# Patient Record
Sex: MALE | Race: WHITE | NOT HISPANIC OR LATINO
[De-identification: names, ages, dates, MRNs, and addresses within clinical notes are randomized per-mention and may not be internally consistent; named-entity substitution may affect disease eponyms.]

---

## 2019-07-16 ENCOUNTER — APPOINTMENT (OUTPATIENT)
Dept: FAMILY MEDICINE | Facility: CLINIC | Age: 69
End: 2019-07-16
Payer: MEDICARE

## 2019-07-16 ENCOUNTER — RECORD ABSTRACTING (OUTPATIENT)
Age: 69
End: 2019-07-16

## 2019-07-16 VITALS — DIASTOLIC BLOOD PRESSURE: 75 MMHG | SYSTOLIC BLOOD PRESSURE: 115 MMHG | TEMPERATURE: 72 F

## 2019-07-16 VITALS
TEMPERATURE: 98.1 F | HEIGHT: 68 IN | OXYGEN SATURATION: 98 % | HEART RATE: 89 BPM | WEIGHT: 190.4 LBS | BODY MASS INDEX: 28.85 KG/M2

## 2019-07-16 DIAGNOSIS — Z80.3 FAMILY HISTORY OF MALIGNANT NEOPLASM OF BREAST: ICD-10-CM

## 2019-07-16 DIAGNOSIS — Z83.79 FAMILY HISTORY OF OTHER DISEASES OF THE DIGESTIVE SYSTEM: ICD-10-CM

## 2019-07-16 DIAGNOSIS — Z78.9 OTHER SPECIFIED HEALTH STATUS: ICD-10-CM

## 2019-07-16 DIAGNOSIS — Z87.39 PERSONAL HISTORY OF OTHER DISEASES OF THE MUSCULOSKELETAL SYSTEM AND CONNECTIVE TISSUE: ICD-10-CM

## 2019-07-16 DIAGNOSIS — I25.2 OLD MYOCARDIAL INFARCTION: ICD-10-CM

## 2019-07-16 PROCEDURE — 99214 OFFICE O/P EST MOD 30 MIN: CPT

## 2020-02-13 ENCOUNTER — APPOINTMENT (OUTPATIENT)
Dept: FAMILY MEDICINE | Facility: CLINIC | Age: 70
End: 2020-02-13
Payer: MEDICARE

## 2020-02-13 VITALS
DIASTOLIC BLOOD PRESSURE: 68 MMHG | OXYGEN SATURATION: 97 % | HEART RATE: 83 BPM | BODY MASS INDEX: 29.4 KG/M2 | TEMPERATURE: 97.8 F | WEIGHT: 193.38 LBS | SYSTOLIC BLOOD PRESSURE: 118 MMHG

## 2020-02-13 DIAGNOSIS — G47.00 INSOMNIA, UNSPECIFIED: ICD-10-CM

## 2020-02-13 PROCEDURE — 99213 OFFICE O/P EST LOW 20 MIN: CPT | Mod: 25

## 2020-02-13 PROCEDURE — 36415 COLL VENOUS BLD VENIPUNCTURE: CPT

## 2020-02-13 RX ORDER — METOPROLOL SUCCINATE 100 MG/1
100 TABLET, EXTENDED RELEASE ORAL
Refills: 0 | Status: DISCONTINUED | COMMUNITY
End: 2020-02-13

## 2020-02-13 RX ORDER — PANTOPRAZOLE SODIUM 40 MG/1
40 TABLET, DELAYED RELEASE ORAL
Refills: 0 | Status: DISCONTINUED | COMMUNITY
End: 2020-02-13

## 2020-02-13 RX ORDER — EZETIMIBE 10 MG/1
10 TABLET ORAL
Refills: 0 | Status: DISCONTINUED | COMMUNITY
End: 2020-02-13

## 2020-02-13 NOTE — PHYSICAL EXAM
[Normal Sclera/Conjunctiva] : normal sclera/conjunctiva [Normal Outer Ear/Nose] : the outer ears and nose were normal in appearance [Normal] : normal rate, regular rhythm, normal S1 and S2 and no murmur heard [Normal Supraclavicular Nodes] : no supraclavicular lymphadenopathy [Normal Posterior Cervical Nodes] : no posterior cervical lymphadenopathy [Normal Anterior Cervical Nodes] : no anterior cervical lymphadenopathy

## 2020-02-13 NOTE — ASSESSMENT
[FreeTextEntry1] : Patient to go for a chest x-ray, renewal of medication, patient is to consider getting sleep studies as recommended.

## 2020-02-13 NOTE — HISTORY OF PRESENT ILLNESS
[FreeTextEntry1] : Pt is here for refills on medications. Pt needs Atorvastatin 40 mg, Lisinopril 5 mg, Metoprolol Tart 25 mg, Pantoprazole 40 mg, Clonazepam 0.5 mg.   [de-identified] : 70 yo male here for medication renewal, complaining of night sweats.

## 2020-02-18 LAB
ALBUMIN SERPL ELPH-MCNC: 4.7 G/DL
ALP BLD-CCNC: 42 U/L
ALT SERPL-CCNC: 36 U/L
ANION GAP SERPL CALC-SCNC: 12 MMOL/L
AST SERPL-CCNC: 37 U/L
BASOPHILS # BLD AUTO: 0.07 K/UL
BASOPHILS NFR BLD AUTO: 0.7 %
BILIRUB DIRECT SERPL-MCNC: 0.1 MG/DL
BILIRUB INDIRECT SERPL-MCNC: 0.1 MG/DL
BILIRUB SERPL-MCNC: 0.2 MG/DL
BUN SERPL-MCNC: 17 MG/DL
CALCIUM SERPL-MCNC: 10.3 MG/DL
CHLORIDE SERPL-SCNC: 106 MMOL/L
CHOLEST SERPL-MCNC: 142 MG/DL
CHOLEST/HDLC SERPL: 3.6 RATIO
CO2 SERPL-SCNC: 23 MMOL/L
CREAT SERPL-MCNC: 1.3 MG/DL
EOSINOPHIL # BLD AUTO: 0.21 K/UL
EOSINOPHIL NFR BLD AUTO: 2.2 %
ESTIMATED AVERAGE GLUCOSE: 123 MG/DL
GLUCOSE SERPL-MCNC: 98 MG/DL
HBA1C MFR BLD HPLC: 5.9 %
HCT VFR BLD CALC: 47.5 %
HDLC SERPL-MCNC: 39 MG/DL
HGB BLD-MCNC: 14.2 G/DL
IMM GRANULOCYTES NFR BLD AUTO: 0.2 %
LDLC SERPL CALC-MCNC: 74 MG/DL
LYMPHOCYTES # BLD AUTO: 2.82 K/UL
LYMPHOCYTES NFR BLD AUTO: 30.1 %
MAN DIFF?: NORMAL
MCHC RBC-ENTMCNC: 24.7 PG
MCHC RBC-ENTMCNC: 29.9 GM/DL
MCV RBC AUTO: 82.5 FL
MONOCYTES # BLD AUTO: 0.82 K/UL
MONOCYTES NFR BLD AUTO: 8.8 %
NEUTROPHILS # BLD AUTO: 5.42 K/UL
NEUTROPHILS NFR BLD AUTO: 58 %
PLATELET # BLD AUTO: 304 K/UL
POTASSIUM SERPL-SCNC: 4.6 MMOL/L
PROT SERPL-MCNC: 7.2 G/DL
RBC # BLD: 5.76 M/UL
RBC # FLD: 15.9 %
SODIUM SERPL-SCNC: 142 MMOL/L
T4 FREE SERPL-MCNC: 1.3 NG/DL
TRIGL SERPL-MCNC: 143 MG/DL
TSH SERPL-ACNC: 1.36 UIU/ML
WBC # FLD AUTO: 9.36 K/UL

## 2020-02-25 DIAGNOSIS — J84.112 IDIOPATHIC PULMONARY FIBROSIS: ICD-10-CM

## 2021-02-10 ENCOUNTER — NON-APPOINTMENT (OUTPATIENT)
Age: 71
End: 2021-02-10

## 2021-02-10 ENCOUNTER — APPOINTMENT (OUTPATIENT)
Dept: FAMILY MEDICINE | Facility: CLINIC | Age: 71
End: 2021-02-10
Payer: MEDICARE

## 2021-02-10 ENCOUNTER — RESULT CHARGE (OUTPATIENT)
Age: 71
End: 2021-02-10

## 2021-02-10 VITALS
BODY MASS INDEX: 28.64 KG/M2 | HEIGHT: 68 IN | TEMPERATURE: 97.1 F | HEART RATE: 67 BPM | WEIGHT: 189 LBS | OXYGEN SATURATION: 98 %

## 2021-02-10 VITALS — HEART RATE: 72 BPM | SYSTOLIC BLOOD PRESSURE: 120 MMHG | DIASTOLIC BLOOD PRESSURE: 75 MMHG

## 2021-02-10 LAB
BILIRUB UR QL STRIP: NORMAL
GLUCOSE UR-MCNC: NORMAL
HCG UR QL: 0.2 EU/DL
HGB UR QL STRIP.AUTO: NORMAL
KETONES UR-MCNC: NORMAL
LEUKOCYTE ESTERASE UR QL STRIP: NORMAL
NITRITE UR QL STRIP: NORMAL
PH UR STRIP: 5.5
PROT UR STRIP-MCNC: NORMAL
SP GR UR STRIP: 1.02

## 2021-02-10 PROCEDURE — 99214 OFFICE O/P EST MOD 30 MIN: CPT | Mod: 25

## 2021-02-10 PROCEDURE — 81003 URINALYSIS AUTO W/O SCOPE: CPT | Mod: QW

## 2021-02-10 PROCEDURE — 69210 REMOVE IMPACTED EAR WAX UNI: CPT

## 2021-02-10 PROCEDURE — 36415 COLL VENOUS BLD VENIPUNCTURE: CPT

## 2021-02-10 NOTE — HISTORY OF PRESENT ILLNESS
[FreeTextEntry1] : pt. here for refill and his ear [de-identified] : pt complaining of tinnitus left ear

## 2021-02-11 LAB
ALBUMIN SERPL ELPH-MCNC: 4.6 G/DL
ALP BLD-CCNC: 41 U/L
ALT SERPL-CCNC: 39 U/L
ANION GAP SERPL CALC-SCNC: 15 MMOL/L
AST SERPL-CCNC: 36 U/L
BASOPHILS # BLD AUTO: 0.05 K/UL
BASOPHILS NFR BLD AUTO: 0.6 %
BILIRUB SERPL-MCNC: 0.3 MG/DL
BUN SERPL-MCNC: 17 MG/DL
CALCIUM SERPL-MCNC: 9.9 MG/DL
CHLORIDE SERPL-SCNC: 105 MMOL/L
CHOLEST SERPL-MCNC: 152 MG/DL
CO2 SERPL-SCNC: 19 MMOL/L
CREAT SERPL-MCNC: 1.44 MG/DL
EOSINOPHIL # BLD AUTO: 0.21 K/UL
EOSINOPHIL NFR BLD AUTO: 2.5 %
ESTIMATED AVERAGE GLUCOSE: 126 MG/DL
GLUCOSE SERPL-MCNC: 90 MG/DL
HBA1C MFR BLD HPLC: 6 %
HCT VFR BLD CALC: 47.8 %
HDLC SERPL-MCNC: 40 MG/DL
HGB BLD-MCNC: 14.8 G/DL
IMM GRANULOCYTES NFR BLD AUTO: 0.2 %
LDLC SERPL CALC-MCNC: 83 MG/DL
LYMPHOCYTES # BLD AUTO: 2.61 K/UL
LYMPHOCYTES NFR BLD AUTO: 30.7 %
MAN DIFF?: NORMAL
MCHC RBC-ENTMCNC: 25.3 PG
MCHC RBC-ENTMCNC: 31 GM/DL
MCV RBC AUTO: 81.8 FL
MONOCYTES # BLD AUTO: 0.69 K/UL
MONOCYTES NFR BLD AUTO: 8.1 %
NEUTROPHILS # BLD AUTO: 4.91 K/UL
NEUTROPHILS NFR BLD AUTO: 57.9 %
NONHDLC SERPL-MCNC: 112 MG/DL
PLATELET # BLD AUTO: 308 K/UL
POTASSIUM SERPL-SCNC: 4.4 MMOL/L
PROT SERPL-MCNC: 7.5 G/DL
RBC # BLD: 5.84 M/UL
RBC # FLD: 17 %
SODIUM SERPL-SCNC: 139 MMOL/L
T4 FREE SERPL-MCNC: 1.2 NG/DL
TRIGL SERPL-MCNC: 144 MG/DL
TSH SERPL-ACNC: 1.49 UIU/ML
WBC # FLD AUTO: 8.49 K/UL

## 2021-02-20 ENCOUNTER — NON-APPOINTMENT (OUTPATIENT)
Age: 71
End: 2021-02-20

## 2022-01-19 ENCOUNTER — RX RENEWAL (OUTPATIENT)
Age: 72
End: 2022-01-19

## 2022-01-31 ENCOUNTER — RX RENEWAL (OUTPATIENT)
Age: 72
End: 2022-01-31

## 2022-02-16 ENCOUNTER — RX RENEWAL (OUTPATIENT)
Age: 72
End: 2022-02-16

## 2022-10-19 ENCOUNTER — APPOINTMENT (OUTPATIENT)
Dept: FAMILY MEDICINE | Facility: CLINIC | Age: 72
End: 2022-10-19

## 2022-10-19 VITALS
RESPIRATION RATE: 14 BRPM | OXYGEN SATURATION: 98 % | HEART RATE: 86 BPM | TEMPERATURE: 97.4 F | DIASTOLIC BLOOD PRESSURE: 68 MMHG | WEIGHT: 177.2 LBS | SYSTOLIC BLOOD PRESSURE: 106 MMHG | BODY MASS INDEX: 26.94 KG/M2

## 2022-10-19 DIAGNOSIS — Z00.00 ENCOUNTER FOR GENERAL ADULT MEDICAL EXAMINATION W/OUT ABNORMAL FINDINGS: ICD-10-CM

## 2022-10-19 PROCEDURE — 36415 COLL VENOUS BLD VENIPUNCTURE: CPT

## 2022-10-19 PROCEDURE — G0439: CPT

## 2022-10-19 PROCEDURE — G0444 DEPRESSION SCREEN ANNUAL: CPT

## 2022-10-19 RX ORDER — ZOLPIDEM TARTRATE 5 MG/1
5 TABLET ORAL
Qty: 30 | Refills: 1 | Status: DISCONTINUED | COMMUNITY
End: 2022-10-19

## 2022-10-19 RX ORDER — ALFUZOSIN HYDROCHLORIDE 10 MG/1
10 TABLET, EXTENDED RELEASE ORAL
Qty: 60 | Refills: 0 | Status: ACTIVE | COMMUNITY
Start: 2022-01-31

## 2022-10-19 RX ORDER — PANTOPRAZOLE 40 MG/1
40 TABLET, DELAYED RELEASE ORAL DAILY
Qty: 90 | Refills: 3 | Status: DISCONTINUED | COMMUNITY
Start: 2022-01-31 | End: 2022-10-19

## 2022-10-19 RX ORDER — FENOFIBRIC ACID 135 MG/1
135 CAPSULE, DELAYED RELEASE ORAL
Qty: 30 | Refills: 0 | Status: ACTIVE | COMMUNITY
Start: 2022-02-03

## 2022-10-19 NOTE — HISTORY OF PRESENT ILLNESS
[FreeTextEntry1] : Pt is here for CPE [de-identified] : 71 y/o with pmhx of  CAD s/p MI (2003 no stents), Carotid artery stenosis, HLD, HTN, GERD, BPH presents for CPE. Patient states that he has been having diffuse muscle pains. States it has started out of no where and has been on going for the past 3 weeks. Denies any trauma or recent illnesses. Denies any muscle weakness, numbness or tingling. Denies any joint pain. He follows with a cardiologist in [facility], Dr. Cachorro Mccord and states that at his last visit everything was normal.

## 2022-10-19 NOTE — ASSESSMENT
[FreeTextEntry1] : CPE:\par -will order CBC w/ diff, CMP, Lipid, TSH and HgbA1c, labs to be drawn in office\par \par Screening:\par -AAA:\par -Colon Cancer screening: patient with previous colonoscopy, he will attempt to obtain records\par \par Vaccinations:\par Seasonal flu  - recommended\par Pneumococcal -recommended\par Shingles - recommended\par \par -previous notes and labs reviewed, consultants notes reviewed\par -patient expressed understanding of plan, all questions answered\par -follow up in 1 year for annual CPE\par \par Muscle aches\par recommend adding coq10 enzyme daily to prevent any statin induced myopathy\par will check labs today including inflammatory markers\par if no improvement patient to return to office in 1 month\par \par HTN:\par patient with BP of 106/68 with repeat of 112/68  at today's visit\par -will continue current medication regimen of lisinopril 5mg daily, metoprolol tartrate 25mg BID\par Patient advised to continue following a healthy meal plan with less salt and more fruits and vegetables, in addition to increasing physical activity.\par \par BPH\par continue alfuzosin 10mg daily\par \par HLD\par continue fenofibric acid 135mg daily and continue atorvastatin 40mg daily\par \par

## 2022-10-19 NOTE — HEALTH RISK ASSESSMENT
[Never] : Never [No] : In the past 12 months have you used drugs other than those required for medical reasons? No [0] : 2) Feeling down, depressed, or hopeless: Not at all (0) [PHQ-2 Negative - No further assessment needed] : PHQ-2 Negative - No further assessment needed [Alone] : lives alone [Retired] : retired [[id number]] : 0

## 2022-10-19 NOTE — PHYSICAL EXAM
[No Edema] : there was no peripheral edema [Coordination Grossly Intact] : coordination grossly intact [No Focal Deficits] : no focal deficits [Normal Gait] : normal gait [Normal] : affect was normal and insight and judgment were intact [de-identified] : 5/5 muscle strength all 4 extremities

## 2022-10-19 NOTE — REVIEW OF SYSTEMS
[Muscle Pain] : muscle pain [Fever] : no fever [Chills] : no chills [Chest Pain] : no chest pain [Palpitations] : no palpitations [Lower Ext Edema] : no lower extremity edema [Shortness Of Breath] : no shortness of breath [Wheezing] : no wheezing [Cough] : no cough [Abdominal Pain] : no abdominal pain [Nausea] : no nausea [Constipation] : no constipation [Diarrhea] : no diarrhea [Vomiting] : no vomiting [Dysuria] : no dysuria [Hematuria] : no hematuria [Back Pain] : no back pain [Headache] : no headache [Dizziness] : no dizziness [Anxiety] : no anxiety [Depression] : no depression

## 2022-10-21 LAB
ALBUMIN SERPL ELPH-MCNC: 4.2 G/DL
ALP BLD-CCNC: 46 U/L
ALT SERPL-CCNC: 10 U/L
ANA PAT FLD IF-IMP: ABNORMAL
ANA SER IF-ACNC: ABNORMAL
ANION GAP SERPL CALC-SCNC: 14 MMOL/L
AST SERPL-CCNC: 14 U/L
BASOPHILS # BLD AUTO: 0.05 K/UL
BASOPHILS NFR BLD AUTO: 0.5 %
BILIRUB SERPL-MCNC: 0.2 MG/DL
BUN SERPL-MCNC: 17 MG/DL
CALCIUM SERPL-MCNC: 9.9 MG/DL
CHLORIDE SERPL-SCNC: 105 MMOL/L
CHOLEST SERPL-MCNC: 123 MG/DL
CO2 SERPL-SCNC: 21 MMOL/L
CREAT SERPL-MCNC: 1.17 MG/DL
CRP SERPL-MCNC: 24 MG/L
EGFR: 65 ML/MIN/1.73M2
EOSINOPHIL # BLD AUTO: 0.17 K/UL
EOSINOPHIL NFR BLD AUTO: 1.8 %
ERYTHROCYTE [SEDIMENTATION RATE] IN BLOOD BY WESTERGREN METHOD: 58 MM/HR
ESTIMATED AVERAGE GLUCOSE: 134 MG/DL
GLUCOSE SERPL-MCNC: 117 MG/DL
HBA1C MFR BLD HPLC: 6.3 %
HCT VFR BLD CALC: 38.8 %
HDLC SERPL-MCNC: 38 MG/DL
HGB BLD-MCNC: 12.4 G/DL
IMM GRANULOCYTES NFR BLD AUTO: 0.3 %
LDLC SERPL CALC-MCNC: 68 MG/DL
LYMPHOCYTES # BLD AUTO: 1.71 K/UL
LYMPHOCYTES NFR BLD AUTO: 18.2 %
MAN DIFF?: NORMAL
MCHC RBC-ENTMCNC: 24.8 PG
MCHC RBC-ENTMCNC: 32 GM/DL
MCV RBC AUTO: 77.6 FL
MONOCYTES # BLD AUTO: 0.78 K/UL
MONOCYTES NFR BLD AUTO: 8.3 %
NEUTROPHILS # BLD AUTO: 6.65 K/UL
NEUTROPHILS NFR BLD AUTO: 70.9 %
NONHDLC SERPL-MCNC: 85 MG/DL
PLATELET # BLD AUTO: 438 K/UL
POTASSIUM SERPL-SCNC: 4.3 MMOL/L
PROT SERPL-MCNC: 7 G/DL
RBC # BLD: 5 M/UL
RBC # FLD: 14.6 %
RHEUMATOID FACT SER QL: <10 IU/ML
SODIUM SERPL-SCNC: 139 MMOL/L
TRIGL SERPL-MCNC: 85 MG/DL
TSH SERPL-ACNC: 0.81 UIU/ML
WBC # FLD AUTO: 9.39 K/UL

## 2022-10-25 ENCOUNTER — APPOINTMENT (OUTPATIENT)
Dept: RHEUMATOLOGY | Facility: CLINIC | Age: 72
End: 2022-10-25

## 2022-10-25 VITALS
BODY MASS INDEX: 26.98 KG/M2 | OXYGEN SATURATION: 99 % | SYSTOLIC BLOOD PRESSURE: 115 MMHG | WEIGHT: 178 LBS | DIASTOLIC BLOOD PRESSURE: 67 MMHG | TEMPERATURE: 98.3 F | HEIGHT: 68 IN | HEART RATE: 85 BPM

## 2022-10-25 DIAGNOSIS — R76.8 OTHER SPECIFIED ABNORMAL IMMUNOLOGICAL FINDINGS IN SERUM: ICD-10-CM

## 2022-10-25 PROCEDURE — 99204 OFFICE O/P NEW MOD 45 MIN: CPT | Mod: 25

## 2022-10-25 PROCEDURE — 36415 COLL VENOUS BLD VENIPUNCTURE: CPT

## 2022-10-25 NOTE — ASSESSMENT
[FreeTextEntry1] : 73 y/o male referred to rheumatology for joint pains and abnormal labs.\par Pt started to have sudden muscle aches of arms, thighs x 1 month. Pt has been taking ibuprofen 800mg PRN with improvement in symptoms temporarily. Denies joint swelling, redness, warmth. Denies vision changes, temporal pain, headaches, jaw claudication, cough, fever.\par Reports worsening BPH symptoms with dribbling poor emptying of bladder.\par Labwork by PCP with SUJATA 1:320 with high inflammatory markers.\par \par Pt's sudden b/l proximal UE and LE pains at elderly age with high inflammatory markers is clinically consistent with diagnosis of PMR. Discussed that PMR is generally treated with low-mod dose steroids with slow taper. If refractory, DMARDs such as MTX and TCZ can be considered.\par Pt has positive SUJATA but does not have symptoms of other autoimmune rheum diseases. SUJATA is a marker that is sensitive but not specific for underlying rheumatologic diseases such as lupus. I do not believe pt's SUJATA is relevant to pt's symptoms.\par \par - Obtain labwork to evaluate positive SUJATA and medication safety (for potential MTX and TCZ in future)\par - Rx PDN taper 20mg ->> 5mg/day until seen by me next. Pt advised to call for side effects or return of symptoms with taper down.\par - Pt is on long-term steroid therapy and was advised on the risk of long-term steroids including but not limited to osteonecrosis, peptic ulcers/erosive gastritis, elevated blood pressure, elevated blood sugar, weight gain, osteoporosis, cushingoid features, cataracts, glaucoma, psychosis, infections. Pt was advised to monitor blood sugar and blood pressure routinely.\par - Advised to minimize NSAID use while on PDN to minimize risk of GI bleeding\par - RTC 2 months for follow up.\par

## 2022-10-26 LAB
C3 SERPL-MCNC: 191 MG/DL
C4 SERPL-MCNC: 41 MG/DL
DSDNA AB SER-ACNC: <12 IU/ML
ENA RNP AB SER IA-ACNC: <0.2 AL
ENA SM AB SER IA-ACNC: <0.2 AL
ENA SS-A AB SER IA-ACNC: <0.2 AL
ENA SS-B AB SER IA-ACNC: <0.2 AL
HBV CORE IGG+IGM SER QL: REACTIVE
HBV SURFACE AB SER QL: REACTIVE
HBV SURFACE AG SER QL: NONREACTIVE
HCV AB SER QL: NONREACTIVE
HCV S/CO RATIO: 0.17 S/CO
THYROGLOB AB SERPL-ACNC: <20 IU/ML
THYROPEROXIDASE AB SERPL IA-ACNC: <10 IU/ML

## 2022-10-27 LAB
ANA PAT FLD IF-IMP: ABNORMAL
ANA SER IF-ACNC: ABNORMAL

## 2022-10-28 LAB
M TB IFN-G BLD-IMP: ABNORMAL
QUANTIFERON TB PLUS MITOGEN MINUS NIL: 0.3 IU/ML
QUANTIFERON TB PLUS NIL: 0.03 IU/ML
QUANTIFERON TB PLUS TB1 MINUS NIL: 0 IU/ML
QUANTIFERON TB PLUS TB2 MINUS NIL: 0 IU/ML

## 2022-12-14 ENCOUNTER — APPOINTMENT (OUTPATIENT)
Dept: RHEUMATOLOGY | Facility: CLINIC | Age: 72
End: 2022-12-14

## 2022-12-14 VITALS
OXYGEN SATURATION: 95 % | BODY MASS INDEX: 26.37 KG/M2 | HEIGHT: 68 IN | HEART RATE: 79 BPM | RESPIRATION RATE: 18 BRPM | DIASTOLIC BLOOD PRESSURE: 77 MMHG | WEIGHT: 174 LBS | SYSTOLIC BLOOD PRESSURE: 112 MMHG

## 2022-12-14 PROCEDURE — 99214 OFFICE O/P EST MOD 30 MIN: CPT

## 2022-12-14 RX ORDER — PREDNISONE 5 MG/1
5 TABLET ORAL
Qty: 120 | Refills: 1 | Status: DISCONTINUED | COMMUNITY
Start: 2022-10-25 | End: 2022-12-14

## 2022-12-14 NOTE — HISTORY OF PRESENT ILLNESS
[FreeTextEntry1] : HISTORY: \par 71 y/o male referred to rheumatology for joint pains and abnormal labs. \par Pt started to have sudden muscle aches of arms, thighs x 1 month. Pt has been taking ibuprofen 800mg PRN with improvement in symptoms temporarily. Denies joint swelling, redness, warmth. Denies vision changes, temporal pain, headaches, jaw claudication, cough, fever. \par Reports worsening BPH symptoms with dribbling poor emptying of bladder. \par Labwork by PCP with SUJATA 1:320 with high inflammatory markers. \par \par INTERVAL HISTORY: \par Pt was started on PDN taper from 20mg on last visit but pt's symptoms were not greatly improved. Pt's PDN taper was increased to 40mg with taper down with near resolution of symptoms. However, pt stopped the medication at 20mg/day due to miscommunication. Pt's pains returned few days after being off the medication.\par \par WORKUP: \par Remarkable for (10/2022): Hgb 12.4 (MCV 77.6), Plt 438, SUJATA 1:320 homogeneous, ESR 58, CRP 24, HgbA1C 6.3% \par Normal/neg (10/2022): dsDNA, SSA, SSB, Dillon, RNP, C3 (high), C4 (high), Thyroid Ab, RF, TSH, Hep B/C (consistent with past Hep B infection), Quantiferon TB indeterminate \par

## 2022-12-14 NOTE — ASSESSMENT
[FreeTextEntry1] : 73 y/o male presents as follow up for PMR\par Pt started to have sudden muscle aches of arms, thighs x 1 month. Pt has been taking ibuprofen 800mg PRN with improvement in symptoms temporarily. Denies joint swelling, redness, warmth. Denies vision changes, temporal pain, headaches, jaw claudication, cough, fever. \par Reports worsening BPH symptoms with dribbling poor emptying of bladder. \par Labwork by PCP with SUJATA 1:320 with high inflammatory markers. \par \par Pt's sudden b/l proximal UE and LE pains at elderly age with high inflammatory markers is clinically consistent with diagnosis of PMR. Labwork by me negative for RA markers and SUJATA subserologies. Pt has positive SUJATA but does not have symptoms of other autoimmune rheum diseases. SUJATA is a marker that is sensitive but not specific for underlying rheumatologic diseases such as lupus. I do not believe pt's SUJATA is relevant to pt's symptoms. \par \par Discussed that PMR is generally treated with low-mod dose steroids with slow taper. If refractory, DMARDs such as MTX and TCZ can be considered. \par \par (Consider MTX if refractory) \par (Repeat Quantiferon TB) \par \par - Restart PDN taper 40mg ->> 10mg/day until seen by me next. Pt advised to call for side effects or return of symptoms with taper down.\par - Pt is on long-term steroid therapy and was advised on the risk of long-term steroids including but not limited to osteonecrosis, peptic ulcers/erosive gastritis, elevated blood pressure, elevated blood sugar, weight gain, osteoporosis, cushingoid features, cataracts, glaucoma, psychosis, infections. Pt was advised to monitor blood sugar and blood pressure routinely. \par - Advised to minimize NSAID use while on PDN to minimize risk of GI bleeding \par - Discussed briefly about potential use of MTX if refractory to PDN taper.\par - Repeat Quantiferon TB (indeterminate) if consideration of using TCZ in the future\par - RTC 2 months for follow up. \par

## 2023-01-18 ENCOUNTER — APPOINTMENT (OUTPATIENT)
Dept: FAMILY MEDICINE | Facility: CLINIC | Age: 73
End: 2023-01-18
Payer: MEDICARE

## 2023-01-18 VITALS
HEART RATE: 79 BPM | BODY MASS INDEX: 25.58 KG/M2 | SYSTOLIC BLOOD PRESSURE: 102 MMHG | RESPIRATION RATE: 14 BRPM | OXYGEN SATURATION: 98 % | TEMPERATURE: 97.4 F | WEIGHT: 168.2 LBS | DIASTOLIC BLOOD PRESSURE: 64 MMHG

## 2023-01-18 DIAGNOSIS — D64.9 ANEMIA, UNSPECIFIED: ICD-10-CM

## 2023-01-18 DIAGNOSIS — H61.23 IMPACTED CERUMEN, BILATERAL: ICD-10-CM

## 2023-01-18 DIAGNOSIS — M79.10 MYALGIA, UNSPECIFIED SITE: ICD-10-CM

## 2023-01-18 DIAGNOSIS — Z86.69 PERSONAL HISTORY OF OTHER DISEASES OF THE NERVOUS SYSTEM AND SENSE ORGANS: ICD-10-CM

## 2023-01-18 DIAGNOSIS — K21.9 GASTRO-ESOPHAGEAL REFLUX DISEASE W/OUT ESOPHAGITIS: ICD-10-CM

## 2023-01-18 PROCEDURE — 36415 COLL VENOUS BLD VENIPUNCTURE: CPT

## 2023-01-18 PROCEDURE — 99214 OFFICE O/P EST MOD 30 MIN: CPT | Mod: 25

## 2023-01-18 NOTE — REVIEW OF SYSTEMS
[Fever] : no fever [Chills] : no chills [Chest Pain] : no chest pain [Palpitations] : no palpitations [Lower Ext Edema] : no lower extremity edema [Shortness Of Breath] : no shortness of breath [Wheezing] : no wheezing [Cough] : no cough [Abdominal Pain] : no abdominal pain [Nausea] : no nausea [Constipation] : no constipation [Diarrhea] : no diarrhea [Vomiting] : no vomiting [Dysuria] : no dysuria [Hematuria] : no hematuria [Back Pain] : no back pain [Headache] : no headache [Dizziness] : no dizziness [Anxiety] : no anxiety [Depression] : no depression

## 2023-01-18 NOTE — ASSESSMENT
[FreeTextEntry1] : HTN:\par BP stable at todays visit\par -will continue lisinopril 5mg daily and meotprolol tartrate 25mg BID\par -Patient advised to continue following a healthy meal plan with less salt and more fruits and vegetables, in addition to increasing physical activity.\par -patient to follow up in 6 months or as needed.\par \par Anemia\par No active signs or symptoms of bleeding\par will check CBC with differential, ferritin, vitamin B12 and folate levels\par \par HLD\par continue atorvastatin 40mg daily\par \par Polymyalgia rheumatica\par continue prednisone taper as per rheum\par currently on 20mg of prednisone daily\par \par GERD\par renewed pantoprazole daily

## 2023-01-18 NOTE — PHYSICAL EXAM
[No Edema] : there was no peripheral edema [Coordination Grossly Intact] : coordination grossly intact [No Focal Deficits] : no focal deficits [Normal Gait] : normal gait [Normal] : affect was normal and insight and judgment were intact [de-identified] : 5/5

## 2023-01-18 NOTE — HISTORY OF PRESENT ILLNESS
[FreeTextEntry1] : Pt is here for medication renewal. [de-identified] : 73 y/o with pmhx of  CAD s/p MI (2003 no stents), Carotid artery stenosis, HLD, HTN, GERD, BPH presents for follow up. Patient has been doing well overall. He is in need of a refill on his medications\par \par For diffuse muscle/joint pain - was seen by rheum Dr. Lal due to elevated inflammatory markers and positive cindy. Was diagnosed with polymyalgia rheumatica. Currently on prednisone taper 40mg -> 10mg daily until he follows up with rheumatology (2 months total). States that his pain has been improving.\par \par For anemia - Patient found to have hemoglobin of 12.4. Previous hemoglobin of 14.8 in 2/2021. Patient denies any abnormal bleeding, blood in the stool or urine or dark tarry stools.

## 2023-01-19 LAB
BASOPHILS # BLD AUTO: 0.04 K/UL
BASOPHILS NFR BLD AUTO: 0.5 %
EOSINOPHIL # BLD AUTO: 0.23 K/UL
EOSINOPHIL NFR BLD AUTO: 2.6 %
FERRITIN SERPL-MCNC: 252 NG/ML
FOLATE SERPL-MCNC: 7 NG/ML
HCT VFR BLD CALC: 45.6 %
HGB BLD-MCNC: 14.2 G/DL
IMM GRANULOCYTES NFR BLD AUTO: 0.5 %
LYMPHOCYTES # BLD AUTO: 2.03 K/UL
LYMPHOCYTES NFR BLD AUTO: 23 %
MAN DIFF?: NORMAL
MCHC RBC-ENTMCNC: 24.4 PG
MCHC RBC-ENTMCNC: 31.1 GM/DL
MCV RBC AUTO: 78.4 FL
MONOCYTES # BLD AUTO: 0.67 K/UL
MONOCYTES NFR BLD AUTO: 7.6 %
NEUTROPHILS # BLD AUTO: 5.83 K/UL
NEUTROPHILS NFR BLD AUTO: 65.8 %
PLATELET # BLD AUTO: 266 K/UL
RBC # BLD: 5.82 M/UL
RBC # FLD: 18.5 %
VIT B12 SERPL-MCNC: 316 PG/ML
WBC # FLD AUTO: 8.84 K/UL

## 2023-02-14 ENCOUNTER — NON-APPOINTMENT (OUTPATIENT)
Age: 73
End: 2023-02-14

## 2023-02-27 ENCOUNTER — APPOINTMENT (OUTPATIENT)
Dept: RHEUMATOLOGY | Facility: CLINIC | Age: 73
End: 2023-02-27
Payer: MEDICARE

## 2023-02-27 VITALS
HEART RATE: 69 BPM | DIASTOLIC BLOOD PRESSURE: 61 MMHG | RESPIRATION RATE: 18 BRPM | SYSTOLIC BLOOD PRESSURE: 115 MMHG | OXYGEN SATURATION: 98 % | HEIGHT: 68 IN

## 2023-02-27 DIAGNOSIS — Z79.52 LONG TERM (CURRENT) USE OF SYSTEMIC STEROIDS: ICD-10-CM

## 2023-02-27 DIAGNOSIS — Z79.899 OTHER LONG TERM (CURRENT) DRUG THERAPY: ICD-10-CM

## 2023-02-27 PROCEDURE — 99214 OFFICE O/P EST MOD 30 MIN: CPT

## 2023-02-27 NOTE — HISTORY OF PRESENT ILLNESS
[FreeTextEntry1] : HISTORY: \par 71 y/o male presents as follow up for PMR \par Pt started to have sudden muscle aches of arms, thighs x 1 month. Pt has been taking ibuprofen 800mg PRN with improvement in symptoms temporarily. Denies joint swelling, redness, warmth. Denies vision changes, temporal pain, headaches, jaw claudication, cough, fever.  \par Reports worsening BPH symptoms with dribbling poor emptying of bladder.  \par Labwork by PCP with SUJATA 1:320 with high inflammatory markers.  \par \par Pt's sudden b/l proximal UE and LE pains at elderly age with high inflammatory markers is clinically consistent with diagnosis of PMR. Labwork by me negative for RA markers and SUJATA subserologies. Pt has positive SUJATA but does not have symptoms of other autoimmune rheum diseases. SUJATA is a marker that is sensitive but not specific for underlying rheumatologic diseases such as lupus. I do not believe pt's SUJATA is relevant to pt's symptoms.  \par \par Discussed that PMR is generally treated with low-mod dose steroids with slow taper. If refractory, DMARDs such as MTX and TCZ can be considered.  \par \par INTERVAL HISTORY: \par Pt reports PDN was stopped due to high PSA. Pt reports his shoulder and hip pains returned as soon as he took off the steroids. Pt had COVID infection 2-3 weeks ago and was placed on Paxlovid. Pt reports b/l forearm erythema, dryness, and pruritus.\par Pt and wife read about MTX and is highly concerned about side effects of MTX and would prefer a different medication. Pt continues to use ibuprofen 800mg up to BID for his pains.\par \par WORKUP:  \par Remarkable for (10/2022): Hgb 12.4 (MCV 77.6), Plt 438, SUJATA 1:320 homogeneous, ESR 58, CRP 24, HgbA1C 6.3% Normal/neg (10/2022): dsDNA, SSA, SSB, Dillon, RNP, C3 (high), C4 (high), Thyroid Ab, RF, TSH, Hep B/C (consistent with past Hep B infection), Quantiferon TB indeterminate

## 2023-02-27 NOTE — ASSESSMENT
[FreeTextEntry1] : 73 y/o male presents as follow up for PMR \par Pt started to have sudden muscle aches of arms, thighs x 1 month. Pt has been taking ibuprofen 800mg PRN with improvement in symptoms temporarily. Denies joint swelling, redness, warmth. Denies vision changes, temporal pain, headaches, jaw claudication, cough, fever.  \par Reports worsening BPH symptoms with dribbling poor emptying of bladder.  \par Labwork by PCP with SUJATA 1:320 with high inflammatory markers.  \par \par Pt's sudden b/l proximal UE and LE pains at elderly age with high inflammatory markers is clinically consistent with diagnosis of PMR. Labwork by me negative for RA markers and SUJATA subserologies. Pt has positive SUJATA but does not have symptoms of other autoimmune rheum diseases. SUJATA is a marker that is sensitive but not specific for underlying rheumatologic diseases such as lupus. I do not believe pt's SUJATA is relevant to pt's symptoms. \par \par Pt was treated for PMR with PDN taper with resolution of his symptoms. However, it was stopped by another physician for elevated PSA on PDN. I discussed MTX as potential treatment for PMR, but after reading about the medication, patient and wife would like to see if there are other medications.\par \par - Rx LEF 20mg PO daily. Side effects of LEF were discussed with the patient in detail including but not limited to GI upset, HTN, hepatotoxicity, and cytopenias.\par This is a high-risk therapy requiring intense monitoring for toxicity. Will evaluate with frequent monitoring of BP, CBC, and LFTs.\par - c/w ibuprofen 800mg PO TID PRN for pains judiciously. I advised that the NSAID should be taken with food.  In addition while taking the prescribed NSAID, no over the counter or other NSAIDs should be used, such as ibuprofen (Motrin or Advil) or naproxen (Aleve) as this can cause stomach upset or other side effects.  If needed for fever or breakthrough pain Tylenol can be used.\par - Consider MTX or TCZ if refractory to LEF\par - Repeat Quantiferon TB (indeterminate) if consideration of using TCZ in the future \par - RTC 3 months for follow up. \par

## 2023-03-09 ENCOUNTER — NON-APPOINTMENT (OUTPATIENT)
Age: 73
End: 2023-03-09

## 2023-04-09 ENCOUNTER — EMERGENCY (EMERGENCY)
Facility: HOSPITAL | Age: 73
LOS: 1 days | Discharge: HOME | End: 2023-04-09
Attending: STUDENT IN AN ORGANIZED HEALTH CARE EDUCATION/TRAINING PROGRAM
Payer: MEDICARE

## 2023-04-09 VITALS
HEIGHT: 68 IN | HEART RATE: 109 BPM | TEMPERATURE: 99 F | WEIGHT: 179.9 LBS | RESPIRATION RATE: 20 BRPM | SYSTOLIC BLOOD PRESSURE: 139 MMHG | DIASTOLIC BLOOD PRESSURE: 96 MMHG | OXYGEN SATURATION: 98 %

## 2023-04-09 LAB
APPEARANCE UR: ABNORMAL
BACTERIA # UR AUTO: ABNORMAL
BILIRUB UR-MCNC: NEGATIVE — SIGNIFICANT CHANGE UP
COLOR SPEC: ABNORMAL
DIFF PNL FLD: ABNORMAL
EPI CELLS # UR: SIGNIFICANT CHANGE UP
GLUCOSE UR QL: NEGATIVE — SIGNIFICANT CHANGE UP
KETONES UR-MCNC: ABNORMAL
LEUKOCYTE ESTERASE UR-ACNC: ABNORMAL
NITRITE UR-MCNC: POSITIVE
PH UR: 6.5 — SIGNIFICANT CHANGE UP (ref 5–8)
PROT UR-MCNC: 300 MG/DL — SIGNIFICANT CHANGE UP
RBC CASTS # UR COMP ASSIST: >50 /HPF (ref 0–4)
SP GR SPEC: 1.01 — SIGNIFICANT CHANGE UP (ref 1.01–1.02)
UROBILINOGEN FLD QL: NEGATIVE — SIGNIFICANT CHANGE UP
WBC UR QL: SIGNIFICANT CHANGE UP /HPF (ref 0–5)

## 2023-04-09 PROCEDURE — 99284 EMERGENCY DEPT VISIT MOD MDM: CPT | Mod: GC

## 2023-04-09 RX ORDER — CEPHALEXIN 500 MG
500 CAPSULE ORAL ONCE
Refills: 0 | Status: COMPLETED | OUTPATIENT
Start: 2023-04-09 | End: 2023-04-09

## 2023-04-09 RX ORDER — CEPHALEXIN 500 MG
1 CAPSULE ORAL
Qty: 28 | Refills: 0
Start: 2023-04-09 | End: 2023-04-15

## 2023-04-09 RX ORDER — CEPHALEXIN 500 MG
1 CAPSULE ORAL
Qty: 12 | Refills: 0
Start: 2023-04-09 | End: 2023-04-18

## 2023-04-09 RX ADMIN — Medication 500 MILLIGRAM(S): at 22:07

## 2023-04-09 NOTE — ED PROVIDER NOTE - NSFOLLOWUPINSTRUCTIONS_ED_ALL_ED_FT
Your craven catheter was blocked with blood clots and it was changed in the ED today. Your urine sample shows that you have a urinary tract infection.     -Follow up with YOUR UROLOGIST for further evaluation and management of your CRAVEN CATHETER & BLOODY URINE.    -Medications have been sent to your pharmacy. Pick them up and take them as directed.     -For pain you can take ibuprofen (motrin, advil) or acetaminophen (tylenol) as needed, as directed on the packaging.     -Follow up with you primary care doctor in the next 2 days. The results from today's visit have been provided for him/her to review. If you do not have a primary care doctor call [phone] to find a primary care doctor OR make an appointment with [facility] in [place] at [phone]    -Return to the ER with any new or worsening symptoms including worsening pain, fevers, confusion, bleeding.

## 2023-04-09 NOTE — ED PROVIDER NOTE - CLINICAL SUMMARY MEDICAL DECISION MAKING FREE TEXT BOX
ASSESSMENT:   LIZ CALLES is a 73yo M who presented with gross hematuria and urinating around Sandoval catheter which was placed recently for retention. Vitals stable. No s/s of infection on history. Physical w/ alize blood in bag, no urine. Left inguinal hernia. No abdominal pain.     PLAN: TOV.

## 2023-04-09 NOTE — ED PROVIDER NOTE - OBJECTIVE STATEMENT
LIZ CALLES is a 73yo Male with PMH BPH who presents c/o "blood in urine". PT states he had a craven placed at an outside hospital on Monday for urinary retention. States it was a difficult placement but he went home with a leg bag and was urinating w/o issue and w/o hematuria. He saw his urologist Thursday who plan for TOV in two weeks. PT states he started having gross hematuria Thursday night. The hematuria has been getting progressively worse and how he is urinating around the craven catheter. He denies and abdominal pain, fevers. LIZ CALLES is a 73yo Male with PMH BPH, HTN, HLD who presents c/o "blood in urine". PT states he had a Sandoval placed at an outside hospital on Monday for urinary retention. States it was a difficult placement but he went home with a leg bag and was urinating w/o issue and w/o hematuria. He saw his urologist Thursday who plan for TOV in two weeks. PT states he started having gross hematuria Thursday night. The hematuria has been getting progressively worse and now he is urinating around the Sandoval catheter. He denies and abdominal pain, fevers.

## 2023-04-09 NOTE — ED ADULT NURSE NOTE - OBJECTIVE STATEMENT
alert and oriented x4. Pt presenting to ED complaining of blood in urinary catheter. PMH BPH. As per pt a craven was placed at an outside facility on Monday for urinary retention. Pt states he went home with a leg bag and noticed blood in the leg bag on Thursday. Pt also states he was urinating around the Craven. Denies chest pain, shortness of breath, weakness, abdominal pain, fever, chills, n/v/d. Respirations equal/unlabored. abdomen soft/nondistended. MD Dover at bedside to assess pt. Md Dover removed Craven prior to RN assessment. No active bleeding noted at this time. pt understanding of plan of care. Trial of void in process.

## 2023-04-09 NOTE — ED ADULT NURSE NOTE - CAS ELECT INFOMATION PROVIDED
discharged by MD. pt in no acute distress at time of discharge. Ambulated off unit without difficulty/DC instructions

## 2023-04-09 NOTE — ED ADULT TRIAGE NOTE - CHIEF COMPLAINT QUOTE
Sandoval catheter in place from enlarged prostate, over the past two days Alayna noticed blood in the urine

## 2023-04-09 NOTE — ED PROVIDER NOTE - NS ED ROS FT
Review of Systems  CONSTITUTIONAL: afebrile w/no diaphoresis or weight changes  SKIN: warm, dry w/ no rash or bleeding  EYES: no changes to vision  ENT: no changes in hearing, no sore throat  RESPIRATORY: no cough or SOB  CARDIAC: no chest pain & no palpitations  GI: no abd pain, nausea, vomiting, diarrhea, constipation, or blood in stool/alize blood  GENITO-URINARY: +HEMATURIA  MUSCULOSKELETAL:  no joint pain, swelling or redness  NEUROLOGIC: no weakness, headache, anesthesia or paresthesias  PSYCH: no anxiety, non suicidal, non homicidal, without hallucinations or depression

## 2023-04-09 NOTE — ED PROVIDER NOTE - ATTENDING CONTRIBUTION TO CARE
72y M w/ hx BPH, HTN, HLD; presents for Sandoval complication. Pt says he had catheter placed 6 days ago at [facility] for urinary retention. Over the past few days began noting some hematuria with debris and few clots. Now says the urine is leaking from around the Sandoval. No fever. Denies blood thinners. Sandoval initially removed in the ED; pt only able to urinate small amount of grossly bloody urine. On post-void POCUS, pt still with >300 cc residual urine. New Sandoval placed; ~500 blood-tinged urine obtained. Will start on Keflex. Stable for discharge with outpatient urology f/u. 72y M w/ hx BPH, HTN, HLD; presents for Sandoval complication. Pt says he had catheter placed 6 days ago at [facility] for urinary retention. Over the past few days began noting some hematuria with debris and few clots. Now says the urine is leaking from around the Sandoval. No fever. Denies blood thinners. On exam, pt well appearing and in no distress. +Mild suprapubic tenderness. Bloody urine noted in Sandoval bag. Sandoval initially removed in the ED; pt only able to urinate small amount of grossly bloody urine. On post-void POCUS, pt still with >300 cc residual urine. New Sandoval placed; ~500 blood-tinged urine obtained. Will start on Keflex. Stable for discharge with outpatient urology f/u.

## 2023-04-09 NOTE — ED PROVIDER NOTE - PATIENT PORTAL LINK FT
You can access the FollowMyHealth Patient Portal offered by [facility] by registering at the following website: [email or web address]. By joining [employer]’s FollowMyHealth portal, you will also be able to view your health information using other applications (apps) compatible with our system.

## 2023-04-09 NOTE — ED ADULT NURSE REASSESSMENT NOTE - NS ED NURSE REASSESS COMMENT FT1
Indwelling urinary "craven" catheter inserted using sterile technique by MD Dover. Procedure, risks, and benefits of catheter explained to patient, patient verbalized understanding. Pt tolerated well. Urinary catheter drained 600 ml of blood tinged/red urine, no clots visualized. Bedside drainage to gravity. Stat lock in place.

## 2023-04-09 NOTE — ED PROVIDER NOTE - PHYSICAL EXAMINATION
VITAL SIGNS: I have reviewed vital signs  CONSTITUTIONAL:  in no acute distress.  SKIN: Warm, dry, no rash.  HEAD: Normocephalic, atraumatic.  EYES: PERRL, conjunctiva and sclera clear.  ENT: pink & moist mucosa, no pharyngeal erythema  NECK: Supple, non tender. No cervical lymphadenopathy.  CARD: Regular rate and rhythm. No murmurs.   RESP: No wheezes, rales or rhonchi.   ABD:  soft, non-distended, non-tender.   : PT w/ leg bag w/ small alize blood in bag. No urine. No suprapubic tenderness. Left inguina hernia.   MSK:  Good ROM in upper/lower extremities w/o pain.   NEURO: Alert, oriented. Grossly unremarkable. No focal deficits.   PSYCH: Cooperative, alert & oriented x3

## 2023-04-11 LAB
CULTURE RESULTS: SIGNIFICANT CHANGE UP
SPECIMEN SOURCE: SIGNIFICANT CHANGE UP

## 2023-04-12 ENCOUNTER — INPATIENT (INPATIENT)
Facility: HOSPITAL | Age: 73
LOS: 3 days | Discharge: HOME | DRG: 696 | End: 2023-04-16
Attending: INTERNAL MEDICINE | Admitting: STUDENT IN AN ORGANIZED HEALTH CARE EDUCATION/TRAINING PROGRAM
Payer: MEDICARE

## 2023-04-12 VITALS
SYSTOLIC BLOOD PRESSURE: 127 MMHG | TEMPERATURE: 98 F | DIASTOLIC BLOOD PRESSURE: 76 MMHG | HEART RATE: 106 BPM | HEIGHT: 68 IN | OXYGEN SATURATION: 96 % | RESPIRATION RATE: 20 BRPM

## 2023-04-12 DIAGNOSIS — R31.9 HEMATURIA, UNSPECIFIED: ICD-10-CM

## 2023-04-12 PROBLEM — Z87.438 PERSONAL HISTORY OF OTHER DISEASES OF MALE GENITAL ORGANS: Chronic | Status: ACTIVE | Noted: 2023-04-09

## 2023-04-12 PROBLEM — I10 ESSENTIAL (PRIMARY) HYPERTENSION: Chronic | Status: ACTIVE | Noted: 2023-04-09

## 2023-04-12 LAB
ALBUMIN SERPL ELPH-MCNC: 3.7 G/DL — SIGNIFICANT CHANGE UP (ref 3.3–5.2)
ALBUMIN SERPL ELPH-MCNC: 3.8 G/DL — SIGNIFICANT CHANGE UP (ref 3.3–5.2)
ALP SERPL-CCNC: 33 U/L — LOW (ref 40–120)
ALP SERPL-CCNC: 37 U/L — LOW (ref 40–120)
ALT FLD-CCNC: 11 U/L — SIGNIFICANT CHANGE UP
ALT FLD-CCNC: 14 U/L — SIGNIFICANT CHANGE UP
ANION GAP SERPL CALC-SCNC: 11 MMOL/L — SIGNIFICANT CHANGE UP (ref 5–17)
ANION GAP SERPL CALC-SCNC: 12 MMOL/L — SIGNIFICANT CHANGE UP (ref 5–17)
APTT BLD: 41.6 SEC — HIGH (ref 27.5–35.5)
AST SERPL-CCNC: 20 U/L — SIGNIFICANT CHANGE UP
AST SERPL-CCNC: 21 U/L — SIGNIFICANT CHANGE UP
BASOPHILS # BLD AUTO: 0.03 K/UL — SIGNIFICANT CHANGE UP (ref 0–0.2)
BASOPHILS # BLD AUTO: 0.05 K/UL — SIGNIFICANT CHANGE UP (ref 0–0.2)
BASOPHILS NFR BLD AUTO: 0.4 % — SIGNIFICANT CHANGE UP (ref 0–2)
BASOPHILS NFR BLD AUTO: 0.6 % — SIGNIFICANT CHANGE UP (ref 0–2)
BILIRUB SERPL-MCNC: 0.2 MG/DL — LOW (ref 0.4–2)
BILIRUB SERPL-MCNC: <0.2 MG/DL — LOW (ref 0.4–2)
BLD GP AB SCN SERPL QL: SIGNIFICANT CHANGE UP
BUN SERPL-MCNC: 17.4 MG/DL — SIGNIFICANT CHANGE UP (ref 8–20)
BUN SERPL-MCNC: 21.6 MG/DL — HIGH (ref 8–20)
CALCIUM SERPL-MCNC: 9.6 MG/DL — SIGNIFICANT CHANGE UP (ref 8.4–10.5)
CALCIUM SERPL-MCNC: 9.6 MG/DL — SIGNIFICANT CHANGE UP (ref 8.4–10.5)
CHLORIDE SERPL-SCNC: 104 MMOL/L — SIGNIFICANT CHANGE UP (ref 96–108)
CHLORIDE SERPL-SCNC: 105 MMOL/L — SIGNIFICANT CHANGE UP (ref 96–108)
CO2 SERPL-SCNC: 23 MMOL/L — SIGNIFICANT CHANGE UP (ref 22–29)
CO2 SERPL-SCNC: 24 MMOL/L — SIGNIFICANT CHANGE UP (ref 22–29)
CREAT SERPL-MCNC: 0.88 MG/DL — SIGNIFICANT CHANGE UP (ref 0.5–1.3)
CREAT SERPL-MCNC: 0.93 MG/DL — SIGNIFICANT CHANGE UP (ref 0.5–1.3)
EGFR: 86 ML/MIN/1.73M2 — SIGNIFICANT CHANGE UP
EGFR: 90 ML/MIN/1.73M2 — SIGNIFICANT CHANGE UP
EOSINOPHIL # BLD AUTO: 0.11 K/UL — SIGNIFICANT CHANGE UP (ref 0–0.5)
EOSINOPHIL # BLD AUTO: 0.24 K/UL — SIGNIFICANT CHANGE UP (ref 0–0.5)
EOSINOPHIL NFR BLD AUTO: 1.6 % — SIGNIFICANT CHANGE UP (ref 0–6)
EOSINOPHIL NFR BLD AUTO: 3.1 % — SIGNIFICANT CHANGE UP (ref 0–6)
FLUAV AG NPH QL: SIGNIFICANT CHANGE UP
FLUBV AG NPH QL: SIGNIFICANT CHANGE UP
GLUCOSE SERPL-MCNC: 120 MG/DL — HIGH (ref 70–99)
GLUCOSE SERPL-MCNC: 83 MG/DL — SIGNIFICANT CHANGE UP (ref 70–99)
HCT VFR BLD CALC: 33.1 % — LOW (ref 39–50)
HCT VFR BLD CALC: 33.3 % — LOW (ref 39–50)
HGB BLD-MCNC: 10.4 G/DL — LOW (ref 13–17)
HGB BLD-MCNC: 10.6 G/DL — LOW (ref 13–17)
IMM GRANULOCYTES NFR BLD AUTO: 0.3 % — SIGNIFICANT CHANGE UP (ref 0–0.9)
IMM GRANULOCYTES NFR BLD AUTO: 0.4 % — SIGNIFICANT CHANGE UP (ref 0–0.9)
INR BLD: 1.21 RATIO — HIGH (ref 0.88–1.16)
LYMPHOCYTES # BLD AUTO: 0.76 K/UL — LOW (ref 1–3.3)
LYMPHOCYTES # BLD AUTO: 1.52 K/UL — SIGNIFICANT CHANGE UP (ref 1–3.3)
LYMPHOCYTES # BLD AUTO: 10.9 % — LOW (ref 13–44)
LYMPHOCYTES # BLD AUTO: 19.5 % — SIGNIFICANT CHANGE UP (ref 13–44)
MAGNESIUM SERPL-MCNC: 1.7 MG/DL — SIGNIFICANT CHANGE UP (ref 1.6–2.6)
MCHC RBC-ENTMCNC: 24.1 PG — LOW (ref 27–34)
MCHC RBC-ENTMCNC: 24.6 PG — LOW (ref 27–34)
MCHC RBC-ENTMCNC: 31.2 GM/DL — LOW (ref 32–36)
MCHC RBC-ENTMCNC: 32 GM/DL — SIGNIFICANT CHANGE UP (ref 32–36)
MCV RBC AUTO: 76.8 FL — LOW (ref 80–100)
MCV RBC AUTO: 77.1 FL — LOW (ref 80–100)
MONOCYTES # BLD AUTO: 0.46 K/UL — SIGNIFICANT CHANGE UP (ref 0–0.9)
MONOCYTES # BLD AUTO: 0.68 K/UL — SIGNIFICANT CHANGE UP (ref 0–0.9)
MONOCYTES NFR BLD AUTO: 6.6 % — SIGNIFICANT CHANGE UP (ref 2–14)
MONOCYTES NFR BLD AUTO: 8.7 % — SIGNIFICANT CHANGE UP (ref 2–14)
NEUTROPHILS # BLD AUTO: 5.29 K/UL — SIGNIFICANT CHANGE UP (ref 1.8–7.4)
NEUTROPHILS # BLD AUTO: 5.61 K/UL — SIGNIFICANT CHANGE UP (ref 1.8–7.4)
NEUTROPHILS NFR BLD AUTO: 67.8 % — SIGNIFICANT CHANGE UP (ref 43–77)
NEUTROPHILS NFR BLD AUTO: 80.1 % — HIGH (ref 43–77)
PHOSPHATE SERPL-MCNC: 2.6 MG/DL — SIGNIFICANT CHANGE UP (ref 2.4–4.7)
PLATELET # BLD AUTO: 294 K/UL — SIGNIFICANT CHANGE UP (ref 150–400)
PLATELET # BLD AUTO: 310 K/UL — SIGNIFICANT CHANGE UP (ref 150–400)
POTASSIUM SERPL-MCNC: 4 MMOL/L — SIGNIFICANT CHANGE UP (ref 3.5–5.3)
POTASSIUM SERPL-MCNC: 4.3 MMOL/L — SIGNIFICANT CHANGE UP (ref 3.5–5.3)
POTASSIUM SERPL-SCNC: 4 MMOL/L — SIGNIFICANT CHANGE UP (ref 3.5–5.3)
POTASSIUM SERPL-SCNC: 4.3 MMOL/L — SIGNIFICANT CHANGE UP (ref 3.5–5.3)
PROT SERPL-MCNC: 6.2 G/DL — LOW (ref 6.6–8.7)
PROT SERPL-MCNC: 6.7 G/DL — SIGNIFICANT CHANGE UP (ref 6.6–8.7)
PROTHROM AB SERPL-ACNC: 14.1 SEC — HIGH (ref 10.5–13.4)
RBC # BLD: 4.31 M/UL — SIGNIFICANT CHANGE UP (ref 4.2–5.8)
RBC # BLD: 4.32 M/UL — SIGNIFICANT CHANGE UP (ref 4.2–5.8)
RBC # FLD: 15.3 % — HIGH (ref 10.3–14.5)
RBC # FLD: 15.5 % — HIGH (ref 10.3–14.5)
RSV RNA NPH QL NAA+NON-PROBE: SIGNIFICANT CHANGE UP
SARS-COV-2 RNA SPEC QL NAA+PROBE: SIGNIFICANT CHANGE UP
SODIUM SERPL-SCNC: 138 MMOL/L — SIGNIFICANT CHANGE UP (ref 135–145)
SODIUM SERPL-SCNC: 141 MMOL/L — SIGNIFICANT CHANGE UP (ref 135–145)
WBC # BLD: 7 K/UL — SIGNIFICANT CHANGE UP (ref 3.8–10.5)
WBC # BLD: 7.8 K/UL — SIGNIFICANT CHANGE UP (ref 3.8–10.5)
WBC # FLD AUTO: 7 K/UL — SIGNIFICANT CHANGE UP (ref 3.8–10.5)
WBC # FLD AUTO: 7.8 K/UL — SIGNIFICANT CHANGE UP (ref 3.8–10.5)

## 2023-04-12 PROCEDURE — 99285 EMERGENCY DEPT VISIT HI MDM: CPT | Mod: FS

## 2023-04-12 PROCEDURE — 12345: CPT | Mod: NC,GC

## 2023-04-12 PROCEDURE — 74177 CT ABD & PELVIS W/CONTRAST: CPT | Mod: 26,MA

## 2023-04-12 PROCEDURE — 99223 1ST HOSP IP/OBS HIGH 75: CPT

## 2023-04-12 RX ORDER — ATORVASTATIN CALCIUM 80 MG/1
1 TABLET, FILM COATED ORAL
Refills: 0 | DISCHARGE

## 2023-04-12 RX ORDER — FINASTERIDE 5 MG/1
5 TABLET, FILM COATED ORAL DAILY
Refills: 0 | Status: DISCONTINUED | OUTPATIENT
Start: 2023-04-12 | End: 2023-04-16

## 2023-04-12 RX ORDER — ATORVASTATIN CALCIUM 80 MG/1
40 TABLET, FILM COATED ORAL AT BEDTIME
Refills: 0 | Status: DISCONTINUED | OUTPATIENT
Start: 2023-04-12 | End: 2023-04-16

## 2023-04-12 RX ORDER — CEFTRIAXONE 500 MG/1
1000 INJECTION, POWDER, FOR SOLUTION INTRAMUSCULAR; INTRAVENOUS EVERY 24 HOURS
Refills: 0 | Status: DISCONTINUED | OUTPATIENT
Start: 2023-04-13 | End: 2023-04-16

## 2023-04-12 RX ORDER — ASPIRIN/CALCIUM CARB/MAGNESIUM 324 MG
1 TABLET ORAL
Refills: 0 | DISCHARGE

## 2023-04-12 RX ORDER — FENOFIBRATE,MICRONIZED 130 MG
1 CAPSULE ORAL
Refills: 0 | DISCHARGE

## 2023-04-12 RX ORDER — FINASTERIDE 5 MG/1
1 TABLET, FILM COATED ORAL
Refills: 0 | DISCHARGE

## 2023-04-12 RX ORDER — FENOFIBRATE,MICRONIZED 130 MG
145 CAPSULE ORAL DAILY
Refills: 0 | Status: DISCONTINUED | OUTPATIENT
Start: 2023-04-12 | End: 2023-04-16

## 2023-04-12 RX ORDER — METOPROLOL TARTRATE 50 MG
1 TABLET ORAL
Refills: 0 | DISCHARGE

## 2023-04-12 RX ORDER — SODIUM CHLORIDE 9 MG/ML
1000 INJECTION, SOLUTION INTRAVENOUS
Refills: 0 | Status: DISCONTINUED | OUTPATIENT
Start: 2023-04-12 | End: 2023-04-16

## 2023-04-12 RX ORDER — ONDANSETRON 8 MG/1
4 TABLET, FILM COATED ORAL EVERY 8 HOURS
Refills: 0 | Status: DISCONTINUED | OUTPATIENT
Start: 2023-04-12 | End: 2023-04-16

## 2023-04-12 RX ORDER — TAMSULOSIN HYDROCHLORIDE 0.4 MG/1
1 CAPSULE ORAL
Refills: 0 | DISCHARGE

## 2023-04-12 RX ORDER — LISINOPRIL 2.5 MG/1
5 TABLET ORAL DAILY
Refills: 0 | Status: DISCONTINUED | OUTPATIENT
Start: 2023-04-12 | End: 2023-04-16

## 2023-04-12 RX ORDER — LISINOPRIL 2.5 MG/1
1 TABLET ORAL
Refills: 0 | DISCHARGE

## 2023-04-12 RX ORDER — ACETAMINOPHEN 500 MG
650 TABLET ORAL EVERY 6 HOURS
Refills: 0 | Status: DISCONTINUED | OUTPATIENT
Start: 2023-04-12 | End: 2023-04-16

## 2023-04-12 RX ORDER — PANTOPRAZOLE SODIUM 20 MG/1
40 TABLET, DELAYED RELEASE ORAL
Refills: 0 | Status: DISCONTINUED | OUTPATIENT
Start: 2023-04-12 | End: 2023-04-16

## 2023-04-12 RX ORDER — PANTOPRAZOLE SODIUM 20 MG/1
1 TABLET, DELAYED RELEASE ORAL
Refills: 0 | DISCHARGE

## 2023-04-12 RX ORDER — METOPROLOL TARTRATE 50 MG
25 TABLET ORAL
Refills: 0 | Status: DISCONTINUED | OUTPATIENT
Start: 2023-04-12 | End: 2023-04-16

## 2023-04-12 RX ORDER — CEFTRIAXONE 500 MG/1
INJECTION, POWDER, FOR SOLUTION INTRAMUSCULAR; INTRAVENOUS
Refills: 0 | Status: DISCONTINUED | OUTPATIENT
Start: 2023-04-12 | End: 2023-04-16

## 2023-04-12 RX ORDER — LANOLIN ALCOHOL/MO/W.PET/CERES
3 CREAM (GRAM) TOPICAL AT BEDTIME
Refills: 0 | Status: DISCONTINUED | OUTPATIENT
Start: 2023-04-12 | End: 2023-04-16

## 2023-04-12 RX ORDER — CEFTRIAXONE 500 MG/1
1000 INJECTION, POWDER, FOR SOLUTION INTRAMUSCULAR; INTRAVENOUS ONCE
Refills: 0 | Status: COMPLETED | OUTPATIENT
Start: 2023-04-12 | End: 2023-04-12

## 2023-04-12 RX ORDER — TAMSULOSIN HYDROCHLORIDE 0.4 MG/1
0.4 CAPSULE ORAL AT BEDTIME
Refills: 0 | Status: DISCONTINUED | OUTPATIENT
Start: 2023-04-12 | End: 2023-04-16

## 2023-04-12 RX ADMIN — TAMSULOSIN HYDROCHLORIDE 0.4 MILLIGRAM(S): 0.4 CAPSULE ORAL at 21:20

## 2023-04-12 RX ADMIN — CEFTRIAXONE 1000 MILLIGRAM(S): 500 INJECTION, POWDER, FOR SOLUTION INTRAMUSCULAR; INTRAVENOUS at 07:01

## 2023-04-12 RX ADMIN — Medication 3 MILLIGRAM(S): at 21:20

## 2023-04-12 RX ADMIN — Medication 145 MILLIGRAM(S): at 09:33

## 2023-04-12 RX ADMIN — LISINOPRIL 5 MILLIGRAM(S): 2.5 TABLET ORAL at 09:34

## 2023-04-12 RX ADMIN — FINASTERIDE 5 MILLIGRAM(S): 5 TABLET, FILM COATED ORAL at 09:32

## 2023-04-12 RX ADMIN — ATORVASTATIN CALCIUM 40 MILLIGRAM(S): 80 TABLET, FILM COATED ORAL at 21:20

## 2023-04-12 RX ADMIN — Medication 25 MILLIGRAM(S): at 17:22

## 2023-04-12 NOTE — ED PROVIDER NOTE - OBJECTIVE STATEMENT
73 yo male PMHx BPH, HTN, HLD presents to ED c/o urinary cathter complication. Patient had craven initially placed 2 weeks ago for urinary rentention, presented to [facility] at time. Patient presented to ED 4/9 for urinating around craven. +UTI, on cephalexin. Has not urinated since 6pm. +Hematuria x4 days. +Fullness to abdomen. Has appt. with urologist this afternoon. No further complaints at this time.   Uro: Juana Clayton 73 yo male PMHx BPH, HTN, HLD presents to ED c/o clogged craven catheter. Patient had craven initially placed 2 weeks ago for urinary rentention, presented to [facility] at time. Patient presented to ED 4/9 for urinating around craven. +UTI, on cephalexin. Has not urinated since 6pm. +Hematuria x4 days. +Fullness to abdomen. Has appt. with urologist this afternoon. No further complaints at this time.   Uro: Juana Clayton 71 yo male PMHx BPH, HTN, HLD presents to ED c/o clogged craven catheter. Patient had craven initially placed 2 weeks ago for urinary rentention, presented to [facility] at time. Patient presented to ED 4/9 for urinating around craven. +UTI, on cephalexin. Has not urinated since 6pm. +Hematuria x4 days. +Fullness to abdomen. Has appt. with urologist this afternoon. No further complaints at this time.   Denies blood thinners.  Uro: Juana Clayton

## 2023-04-12 NOTE — H&P ADULT - NSHPLABSRESULTS_GEN_ALL_CORE
CT ABDPEL IVC IMPRESSION:  1. Moderate-sized lobulated intravesicular hematoma. An underlying mass   cannot be excluded.  2. Mild perivesicular inflammatory change. Correlate with urinalysis for   evidence of a cystitis.

## 2023-04-12 NOTE — PROGRESS NOTE ADULT - SUBJECTIVE AND OBJECTIVE BOX
Patient is a 72y old  Male who presents with a chief complaint of Hematuria (2023 06:48)      Patient seen and examined at bedside. No overnight events reported.     ALLERGIES:  Benadryl (Unknown)  No Known Allergies    MEDICATIONS  (STANDING):  atorvastatin 40 milliGRAM(s) Oral at bedtime  cefTRIAXone Injectable.      fenofibrate Tablet 145 milliGRAM(s) Oral daily  finasteride 5 milliGRAM(s) Oral daily  lactated ringers. 1000 milliLiter(s) (75 mL/Hr) IV Continuous <Continuous>  lisinopril 5 milliGRAM(s) Oral daily  metoprolol tartrate 25 milliGRAM(s) Oral two times a day  pantoprazole    Tablet 40 milliGRAM(s) Oral before breakfast  tamsulosin 0.4 milliGRAM(s) Oral at bedtime    MEDICATIONS  (PRN):  acetaminophen     Tablet .. 650 milliGRAM(s) Oral every 6 hours PRN Temp greater or equal to 38C (100.4F), Mild Pain (1 - 3)  aluminum hydroxide/magnesium hydroxide/simethicone Suspension 30 milliLiter(s) Oral every 4 hours PRN Dyspepsia  melatonin 3 milliGRAM(s) Oral at bedtime PRN Insomnia  ondansetron Injectable 4 milliGRAM(s) IV Push every 8 hours PRN Nausea and/or Vomiting    Vital Signs Last 24 Hrs  T(F): 97 (2023 08:54), Max: 97.7 (2023 01:52)  HR: 83 (2023 08:54) (83 - 106)  BP: 130/80 (2023 08:54) (127/76 - 130/80)  RR: 20 (2023 01:52) (20 - 20)  SpO2: 98% (2023 08:54) (96% - 98%)  I&O's Summary    PHYSICAL EXAM:  General: NAD, A/O x 3  ENT: MMM, no thrush  Neck: Supple, No JVD  Lungs: Clear to auscultation bilaterally, good air entry, non-labored breathing  Cardio: RRR, S1/S2, No murmur  Abdomen: Soft, Nontender, Nondistended; Bowel sounds present  Extremities: No calf tenderness, No pitting edema  : catheter draining bloody urine    LABS:                        10.6   7.00  )-----------( 294      ( 2023 03:15 )             33.1     04-12    138  |  104  |  21.6  ----------------------------<  120  4.0   |  23.0  |  0.93    Ca    9.6      2023 03:15    TPro  6.7  /  Alb  3.8  /  TBili  <0.2  /  DBili  x   /  AST  21  /  ALT  14  /  AlkPhos  37  04-12          PT/INR - ( 2023 03:15 )   PT: 14.1 sec;   INR: 1.21 ratio         PTT - ( 2023 03:15 )  PTT:41.6 sec        Urinalysis Basic - ( 2023 20:40 )    Color: Red / Appearance: very cloudy / S.015 / pH: x  Gluc: x / Ketone: Trace  / Bili: Negative / Urobili: Negative   Blood: x / Protein: 300 mg/dL / Nitrite: Positive   Leuk Esterase: Trace / RBC: >50 /HPF / WBC 3-5 /HPF   Sq Epi: x / Non Sq Epi: x / Bacteria: Occasional        Culture - Urine (collected 2023 20:40)  Source: Catheterized Catheterized  Final Report (2023 00:04):    >100,000 CFU/ml Coag Negative Staphylococcus "Susceptibilities not    performed"        RADIOLOGY & ADDITIONAL TESTS:    ACC: [id number] EXAM:  CT ABDOMEN AND PELVIS IC   ORDERED BY: KEYSHA ROYAL     PROCEDURE DATE:  2023          INTERPRETATION:  CLINICAL INFORMATION: Hematuria.    COMPARISON: None.    CONTRAST/COMPLICATIONS:  IV Contrast: Omnipaque 350  90 cc administered   10 cc discarded  Oral Contrast: NONE  Complications: None reported at time of study completion    PROCEDURE:  CT of the Abdomen and Pelvis was performed.  Sagittal and coronal reformats were performed.    FINDINGS:  LOWER CHEST: Interstitial reticular opacities, predominantly peripheral,   suggestive of an interstitial lung disease.    LIVER: Within normal limits.  BILE DUCTS: Normal caliber.  GALLBLADDER: Cholelithiasis.  SPLEEN: Within normal limits.  PANCREAS: Within normal limits.  ADRENALS: Indeterminate 1.1 x 0.8 cm right adrenal nodule. Left adrenal   gland within normal limits.  KIDNEYS/URETERS: Parapelvic renal cysts. Bilateral renal subcentimeter   hypodense lesions which are too small for accurate characterization.    BLADDER: Sandoval catheter balloon within the urinary bladder.   Intravesicular gas secondary to instrumentation. Lobulated heterogeneous,   high attenuation intravesicular mass measuring 7.5 x 7.8 x 7.0 cm   compatible with a hematoma. Posterior right urinary bladder diverticulum.   Mild perivesicular inflammatory change.  REPRODUCTIVE ORGANS: Enlarged prostate to 6.4 cm.    BOWEL: Small to moderate-sized hiatal hernia. No bowel obstruction or   inflammation. Colonic diverticulosis without diverticulitis. Appendix is   normal.  PERITONEUM: No ascites.  VESSELS: Circumaortic left renal vein.  RETROPERITONEUM/LYMPH NODES: No lymphadenopathy.  ABDOMINAL WALL: Small bilateral fat-containing inguinal hernias. Small   fat-containing umbilical hernia.  BONES: Bilateral L5 pars interarticularis defects with grade 1   anterolisthesis of L5 on S1 and moderate L5-S1 degenerative disc disease.    IMPRESSION:  1. Moderate-sized lobulated intravesicular hematoma. An underlying mass   cannot be excluded.  2. Mild perivesicular inflammatory change. Correlate with urinalysis for   evidence of a cystitis.      --- End of Report ---      LEONEL NEAL MD; Attending Radiologist  This document has been electronically signed. 2023  5:13AM    Care Discussed with Consultants/Other Providers:     Urology   Patient is a 72y old  Male who presents with a chief complaint of Hematuria (2023 06:48)    Patient seen and examined at bedside.      ALLERGIES:  Benadryl (Unknown)  No Known Allergies    MEDICATIONS  (STANDING):  atorvastatin 40 milliGRAM(s) Oral at bedtime  cefTRIAXone Injectable.      fenofibrate Tablet 145 milliGRAM(s) Oral daily  finasteride 5 milliGRAM(s) Oral daily  lactated ringers. 1000 milliLiter(s) (75 mL/Hr) IV Continuous <Continuous>  lisinopril 5 milliGRAM(s) Oral daily  metoprolol tartrate 25 milliGRAM(s) Oral two times a day  pantoprazole    Tablet 40 milliGRAM(s) Oral before breakfast  tamsulosin 0.4 milliGRAM(s) Oral at bedtime    MEDICATIONS  (PRN):  acetaminophen     Tablet .. 650 milliGRAM(s) Oral every 6 hours PRN Temp greater or equal to 38C (100.4F), Mild Pain (1 - 3)  aluminum hydroxide/magnesium hydroxide/simethicone Suspension 30 milliLiter(s) Oral every 4 hours PRN Dyspepsia  melatonin 3 milliGRAM(s) Oral at bedtime PRN Insomnia  ondansetron Injectable 4 milliGRAM(s) IV Push every 8 hours PRN Nausea and/or Vomiting    Vital Signs Last 24 Hrs  T(F): 97 (2023 08:54), Max: 97.7 (2023 01:52)  HR: 83 (2023 08:54) (83 - 106)  BP: 130/80 (2023 08:54) (127/76 - 130/80)  RR: 20 (2023 01:52) (20 - 20)  SpO2: 98% (2023 08:54) (96% - 98%)  I&O's Summary    PHYSICAL EXAM:  General: NAD, A/O x 3  ENT: MMM, no thrush  Neck: Supple, No JVD  Lungs: Clear to auscultation bilaterally, good air entry, non-labored breathing  Cardio: +s1/s2  Abdomen: Soft, Nontender, Nondistended; Bowel sounds present  Extremities: No calf tenderness, No pitting edema  : +CBI    LABS:                        10.6   7.00  )-----------( 294      ( 2023 03:15 )             33.1     -12    138  |  104  |  21.6  ----------------------------<  120  4.0   |  23.0  |  0.93    Ca    9.6      2023 03:15    TPro  6.7  /  Alb  3.8  /  TBili  <0.2  /  DBili  x   /  AST  21  /  ALT  14  /  AlkPhos  37  04-12    PT/INR - ( 2023 03:15 )   PT: 14.1 sec;   INR: 1.21 ratio    PTT - ( 2023 03:15 )  PTT:41.6 sec    Urinalysis Basic - ( 2023 20:40 )  Color: Red / Appearance: very cloudy / S.015 / pH: x  Gluc: x / Ketone: Trace  / Bili: Negative / Urobili: Negative   Blood: x / Protein: 300 mg/dL / Nitrite: Positive   Leuk Esterase: Trace / RBC: >50 /HPF / WBC 3-5 /HPF   Sq Epi: x / Non Sq Epi: x / Bacteria: Occasional      RADIOLOGY & ADDITIONAL TESTS:  - no new tests    Care Discussed with Consultants/Other Providers:   Urology

## 2023-04-12 NOTE — ED PROVIDER NOTE - ATTENDING APP SHARED VISIT CONTRIBUTION OF CARE
I, Micheal Dumont, performed a face to face bedside interview with this patient regarding history of present illness, review of symptoms and relevant past medical, social and family history.  I completed an independent physical examination. I have communicated the patient’s plan of care and disposition with the ACP.  72 year old male presents with hematuria. Pt reports that he initially had urinary retention and craven placed at another facility. He started to have hematuria on 4/9. Was seen here, craven replaced but hematuria continued and now the craven is clogged  Gen: NAD, well appearing  CV: RRR  Pul: CTA b/l  Abd: Soft, non-distended, ttp suprapubic  Neuro: no focal deficits  : craven in place, urine draining around the catheter, but bag empty  Catheter was irirgated, several clots removed, but dark red bloody urine began to drain. Manually irrigated with 1 L of NS, and unable to get urine to clear, strated on CBI

## 2023-04-12 NOTE — H&P ADULT - HISTORY OF PRESENT ILLNESS
72M with PMHX HTN, HLD, BPH, RA on Leflunomide presents to [facility] ER c/o hematuria and clogged craven catheter. Patient initially presented 2 weeks ago to [facility] for urinary retention had craven placed then seen at [facility] ER on 4/9 for urination around craven cathter and dx UTI placed on Cephelexin now with inability to urinate since 6PM and Hematuria for 4 days with suprapubic ttp. Appt with Urology this afternoon but unable to make it due to ongoing hematuria. Denies F/C. No other complaints. CT ABDPEL shows hematoma and cystitis.     ROS negative unless mentioned.    PMHX: HTN, HLD, BPH, RA on Leflunomide  PSHX: Denies pshx  Social Hx: Denies etoh/tobacco/drug abuse  FamHx: Denies fam hx HTN  NKDA but intolerance to Benadryl

## 2023-04-12 NOTE — H&P ADULT - ASSESSMENT
ASSESSMENT:  72M with PMHX HTN, HLD, BPH, RA on Leflunomide admitted for Recurrent Hematuria s/p CBI, UTI, and Hematoma.     PLAN:  Recurrent Hematuria, Hematoma, UTI  -Admit to Tele  -CBI in place  -Repeat Labs  -Trend H&H  -Hold ASA 81mg  -VTE PPX SCD  -NPO  -IVF LR 75cc/hr  -Ceftriaxone 1g q24  -Urology Consulted    BPH  -Cont Flomax 0.4mg q24 and Finsateride 5mg q24    HTN, HLD  -Fenofibrate 145mg q24  -Lipitor 40mg q24  -Metoprolol Tartrate 25mg BID    RA  -Leflunomide 20mg q24 -> Ask patient to bring medication in from home

## 2023-04-12 NOTE — PROGRESS NOTE ADULT - ASSESSMENT
72M with PMHX HTN, HLD, BPH, RA on Leflunomide admitted for Recurrent Hematuria s/p CBI, UTI, and Hematoma.     #Recurrent hematuria 2/2 intravesicular hematoma  -CTAP noted  -Continuous bladder irrigation  -IVF  -Hold aspirin  -Urology consulted, appreciate recs  -Monitor HH    #UTI  -CTAP suggesting cystitis  -Rocephin  -Follow cultures  -Monitor CBC    #BPH  -Flomax  -Finasteride    #HTN  -Metoprolol    #HLD  -Statin  -Fenofibrate    #RA  -Leflunomide- pt to have home medication brought    #DVT ppx  -Venodynes 72M with PMHX HTN, HLD, BPH, RA on Leflunomide admitted for Recurrent Hematuria s/p CBI, UTI, and Hematoma.     #Recurrent hematuria 2/2 intravesicular hematoma  - no cultures obtained prior to starting cbi of little value now given irrigation on   - will give ceftriaxone while in hospital and discharge on ceftin x 7 days   - aspirin   - ivf  - urology consult appreciated  - monitor h and h   - hematuria improved on cbi - probable will be turned off today and if stable will d/c home d/w urology     #BPH  - Flomax, Finasteride    #HTN- Essential   - monitor blood pressure  - Metoprolol    #HLD  - Statin, Fenofibrate    #RA  - Leflunomide- pt to have home medication brought    #DVT Prophylaxis   - venodynes    plan of care d/w patient wife on patient phone

## 2023-04-12 NOTE — CONSULT NOTE ADULT - ASSESSMENT
Patient is a presenting with hematuria recently seen in the ER on 4/9 for clot retention  the er irrigated the craven and able to send him home. He returns today with return of clot retention, now admitted to medicine, + UTI , CBI started and working well. Urology called to follow   Plan:  Continue CBI  monitor clot retention   IV abx for UTI  trend h/h  no surgical intervention from urology at this time

## 2023-04-12 NOTE — CONSULT NOTE ADULT - SUBJECTIVE AND OBJECTIVE BOX
HPI: 73 yo male PMHx BPH, HTN, HLD presents to ED c/o clogged craven catheter. Patient had craven initially placed 2 weeks ago for urinary rentention, presented to [facility] at time. Patient presented to ED 4/9 for urinating around craven. +UTI, on cephalexin. Has not urinated since 6pm. +Hematuria x4 days. +Fullness to abdomen. Has appt. with urologist this afternoon. No further complaints at this time. Denies blood thinners.        PAST MEDICAL & SURGICAL HISTORY:  History of BPH      HTN (hypertension)        MEDICATIONS  (STANDING):    MEDICATIONS  (PRN):      Allergies    No Known Allergies    Intolerances    Benadryl (Unknown)      SOCIAL HISTORY:    FAMILY HISTORY:      Vital Signs Last 24 Hrs  T(C): 36.5 (12 Apr 2023 01:52), Max: 36.5 (12 Apr 2023 01:52)  T(F): 97.7 (12 Apr 2023 01:52), Max: 97.7 (12 Apr 2023 01:52)  HR: 86 (12 Apr 2023 04:53) (86 - 106)  BP: 127/76 (12 Apr 2023 01:52) (127/76 - 127/76)  BP(mean): --  RR: 20 (12 Apr 2023 01:52) (20 - 20)  SpO2: 96% (12 Apr 2023 01:52) (96% - 96%)    Parameters below as of 12 Apr 2023 01:52  Patient On (Oxygen Delivery Method): room air        PHYSICAL EXAM:    Constitutional: in good spirits     Respiratory: no respiratory distress, no dyspnea    Cardiovascular: NSR on monitor     Genitourinary: CBI running with light pink tinged out put     Neurological: A&OX3        LABS:                        10.6   7.00  )-----------( 294      ( 12 Apr 2023 03:15 )             33.1     04-12    138  |  104  |  21.6<H>  ----------------------------<  120<H>  4.0   |  23.0  |  0.93    Ca    9.6      12 Apr 2023 03:15    TPro  6.7  /  Alb  3.8  /  TBili  <0.2<L>  /  DBili  x   /  AST  21  /  ALT  14  /  AlkPhos  37<L>  04-12    PT/INR - ( 12 Apr 2023 03:15 )   PT: 14.1 sec;   INR: 1.21 ratio         PTT - ( 12 Apr 2023 03:15 )  PTT:41.6 sec      RADIOLOGY & ADDITIONAL STUDIES:

## 2023-04-12 NOTE — ED ADULT NURSE NOTE - OBJECTIVE STATEMENT
PT is A&Ox4, PT reports to ed with complaints of urinary retention. PT has had craven in place since the 9th and has complaints of urinary retention. PT states he feels bladder pressure that has worsened over time, craven with leg bag in place, alize blood noted in the urine collection bag. PT denies other complaints and is breathing equally and unlabored on room air.

## 2023-04-12 NOTE — PROGRESS NOTE ADULT - ATTENDING COMMENTS
I have personally seen and examined patient on the above date.  I discussed the case with MD Resident Dr Arias and I agree with findings and plan as detailed per note above, which I have amended where appropriate.

## 2023-04-12 NOTE — ED PROVIDER NOTE - PROGRESS NOTE DETAILS
NEVA Suresh: Sandoval flushed dislodging small clots. Leg bag filled with alize blood. Patient requiring CBI. NEVA Suresh: Urology consulted.

## 2023-04-12 NOTE — ED PROVIDER NOTE - PHYSICAL EXAMINATION
Gen: Nontoxic, well appearing, in NAD.  Skin: Warm and dry as visualized.  Head: NC/AT.  Eyes: PERRLA. EOMI.  Neck: Supple, FROM. Trachea midline.   Resp: No distress.  Cardio: Well perfused.  Abd: Nondistended. Nontender abdomen. +Suprapubic tenderness.   : +Gross blood in leg bag.   Ext: No deformities. MAEx4. FROM.   Neuro: A&Ox3. Appears nonfocal.   Psych: Normal affect and mood.

## 2023-04-12 NOTE — H&P ADULT - NSHPPHYSICALEXAM_GEN_ALL_CORE
Vital Signs Last 24 Hrs  T(C): 36.5 (12 Apr 2023 01:52), Max: 36.5 (12 Apr 2023 01:52)  T(F): 97.7 (12 Apr 2023 01:52), Max: 97.7 (12 Apr 2023 01:52)  HR: 86 (12 Apr 2023 04:53) (86 - 106)  BP: 127/76 (12 Apr 2023 01:52) (127/76 - 127/76)  BP(mean): --  RR: 20 (12 Apr 2023 01:52) (20 - 20)  SpO2: 96% (12 Apr 2023 01:52) (96% - 96%)    Parameters below as of 12 Apr 2023 01:52  Patient On (Oxygen Delivery Method): room air    Constitutional: Well-appearing, NAD, VSS  Head: NC/AT  Eyes: PERRL, EOMI, anicteric sclera, conjunctiva WNL  ENT: Normal Pharynx, MMM, No tonsillar exudate/erythema  Neck: Supple, Non-tender  Chest: Non-tender, no rashes  Cardio: RRR, s1/s2, no appreciable murmurs/rubs/gallops  Resp: BS CTA bilaterally, no wheezing/rhonchi/rales  Abd: Soft, Non-tender, Non-distended, no rebound/guarding/rigidity  : +CBI in place  Rectal: not examined  MSK: moving all extremities, no motor weakness, full ROM x4  Ext: palpable distal pulses, good capillary refill  Psych: appropriate, cooperative  Neuro: CN II-XII grossly intact, no focal deficits  Skin: Warm/Dry. No rashes.

## 2023-04-12 NOTE — ED PROVIDER NOTE - CLINICAL SUMMARY MEDICAL DECISION MAKING FREE TEXT BOX
73 yo male PMHx BPH, HTN, HLD presents to ED c/o clogged craven catheter. Boris hematuria. Here 4/9 for same. CBI initiated. CT demonstrating blood in bladder. Hemodynamically stable. Patient TBA to medicine for further management. Urology consulted.

## 2023-04-13 LAB
HCV AB S/CO SERPL IA: 0.11 S/CO — SIGNIFICANT CHANGE UP (ref 0–0.99)
HCV AB SERPL-IMP: SIGNIFICANT CHANGE UP

## 2023-04-13 PROCEDURE — 99233 SBSQ HOSP IP/OBS HIGH 50: CPT

## 2023-04-13 RX ORDER — METHOCARBAMOL 500 MG/1
500 TABLET, FILM COATED ORAL ONCE
Refills: 0 | Status: COMPLETED | OUTPATIENT
Start: 2023-04-13 | End: 2023-04-13

## 2023-04-13 RX ORDER — ALPRAZOLAM 0.25 MG
0.25 TABLET ORAL EVERY 8 HOURS
Refills: 0 | Status: DISCONTINUED | OUTPATIENT
Start: 2023-04-13 | End: 2023-04-16

## 2023-04-13 RX ADMIN — Medication 25 MILLIGRAM(S): at 05:31

## 2023-04-13 RX ADMIN — SODIUM CHLORIDE 75 MILLILITER(S): 9 INJECTION, SOLUTION INTRAVENOUS at 21:04

## 2023-04-13 RX ADMIN — METHOCARBAMOL 500 MILLIGRAM(S): 500 TABLET, FILM COATED ORAL at 05:32

## 2023-04-13 RX ADMIN — FINASTERIDE 5 MILLIGRAM(S): 5 TABLET, FILM COATED ORAL at 11:27

## 2023-04-13 RX ADMIN — Medication 650 MILLIGRAM(S): at 01:30

## 2023-04-13 RX ADMIN — Medication 3 MILLIGRAM(S): at 21:04

## 2023-04-13 RX ADMIN — TAMSULOSIN HYDROCHLORIDE 0.4 MILLIGRAM(S): 0.4 CAPSULE ORAL at 21:04

## 2023-04-13 RX ADMIN — CEFTRIAXONE 1000 MILLIGRAM(S): 500 INJECTION, POWDER, FOR SOLUTION INTRAMUSCULAR; INTRAVENOUS at 06:32

## 2023-04-13 RX ADMIN — PANTOPRAZOLE SODIUM 40 MILLIGRAM(S): 20 TABLET, DELAYED RELEASE ORAL at 05:32

## 2023-04-13 RX ADMIN — SODIUM CHLORIDE 75 MILLILITER(S): 9 INJECTION, SOLUTION INTRAVENOUS at 02:12

## 2023-04-13 RX ADMIN — SODIUM CHLORIDE 75 MILLILITER(S): 9 INJECTION, SOLUTION INTRAVENOUS at 09:23

## 2023-04-13 RX ADMIN — ATORVASTATIN CALCIUM 40 MILLIGRAM(S): 80 TABLET, FILM COATED ORAL at 21:04

## 2023-04-13 RX ADMIN — LISINOPRIL 5 MILLIGRAM(S): 2.5 TABLET ORAL at 05:32

## 2023-04-13 RX ADMIN — Medication 0.25 MILLIGRAM(S): at 18:31

## 2023-04-13 RX ADMIN — Medication 145 MILLIGRAM(S): at 11:27

## 2023-04-13 RX ADMIN — Medication 650 MILLIGRAM(S): at 02:12

## 2023-04-13 RX ADMIN — Medication 25 MILLIGRAM(S): at 17:19

## 2023-04-13 NOTE — PROGRESS NOTE ADULT - SUBJECTIVE AND OBJECTIVE BOX
UROLOGY F/U:    Patient seen and examined at bedside.  Wife with patient .  Patient awake, alert, resting comfortable feels better more comfortable than ER, has some low back discomfort, intermittent sciatica discomfort relief with tylenol or muscle relaxant. Tolerating PO diet well    Vital Signs Last 24 Hrs  T(C): 36.8 (13 Apr 2023 11:30), Max: 36.8 (13 Apr 2023 04:42)  T(F): 98.3 (13 Apr 2023 11:30), Max: 98.3 (13 Apr 2023 04:42)  HR: 85 (13 Apr 2023 11:30) (85 - 99)  BP: 129/77 (13 Apr 2023 11:30) (108/64 - 129/77)  RR: 18 (13 Apr 2023 11:30) (18 - 19)  SpO2: 98% (13 Apr 2023 11:30) (97% - 98%)    :  craven in place , cbi slow drip noted urine light pink in tubing CBI clamped closed.  will follow    Labs:  cbc:                        10.4   7.80  )-----------( 310      ( 12 Apr 2023 09:38 )             33.3     Chemistry:  04-12    141  |  105  |  17.4  ----------------------------<  83  4.3   |  24.0  |  0.88      Impression: craven for urinary retention had hematuria clot retention to [facility] ER , cleared clot then CBI started and now hematuria resolving slowly, CBI clamped at present and will follow. If remains no worse than light pink off CBI would be ok to discharge with craven and follow up his choice of [facility], Dr. Espinoza, or Dr. Davidson .  will follow

## 2023-04-13 NOTE — PROGRESS NOTE ADULT - ASSESSMENT
72M with PMHX HTN, HLD, BPH, RA on Leflunomide admitted for Recurrent Hematuria s/p CBI, UTI, and Hematoma.     #Recurrent hematuria 2/2 intravesicular hematoma  CBI is running and still bloody   Afebrile, no WBC   UA (-) for WBC   Ceftriaxone started on admission, continue for 7 days   - urology consult noted   - monitor h and h       #BPH  - Flomax, Finasteride    #HTN- Essential   - monitor blood pressure  - Metoprolol    #HLD  - Statin, Fenofibrate    #RA  - Leflunomide- pt to have home medication brought    #DVT Prophylaxis   - venodynes    Plan discussed with patient

## 2023-04-13 NOTE — PROGRESS NOTE ADULT - SUBJECTIVE AND OBJECTIVE BOX
Patient is a 72y old  Male who presents with a chief complaint of Hematuria (12 Apr 2023 09:56)      INTERVAL HPI/OVERNIGHT EVENTS: seen and examined. c/o food and not having it on time   CBI is running and still bloody     MEDICATIONS  (STANDING):  atorvastatin 40 milliGRAM(s) Oral at bedtime  cefTRIAXone Injectable.      fenofibrate Tablet 145 milliGRAM(s) Oral daily  finasteride 5 milliGRAM(s) Oral daily  lactated ringers. 1000 milliLiter(s) (75 mL/Hr) IV Continuous <Continuous>  lisinopril 5 milliGRAM(s) Oral daily  metoprolol tartrate 25 milliGRAM(s) Oral two times a day  pantoprazole    Tablet 40 milliGRAM(s) Oral before breakfast  tamsulosin 0.4 milliGRAM(s) Oral at bedtime    MEDICATIONS  (PRN):  acetaminophen     Tablet .. 650 milliGRAM(s) Oral every 6 hours PRN Temp greater or equal to 38C (100.4F), Mild Pain (1 - 3)  aluminum hydroxide/magnesium hydroxide/simethicone Suspension 30 milliLiter(s) Oral every 4 hours PRN Dyspepsia  melatonin 3 milliGRAM(s) Oral at bedtime PRN Insomnia  ondansetron Injectable 4 milliGRAM(s) IV Push every 8 hours PRN Nausea and/or Vomiting      Allergies    No Known Allergies    Intolerances    Benadryl (Unknown)      REVIEW OF SYSTEMS:  CONSTITUTIONAL: No fever, weight loss, or fatigue  RESPIRATORY: No cough, wheezing, chills or hemoptysis; No shortness of breath  CARDIOVASCULAR: No chest pain, palpitations, dizziness, or leg swelling  GASTROINTESTINAL: No abdominal or epigastric pain. No nausea, vomiting, or hematemesis; No diarrhea or constipation. No melena or hematochezia.  NEUROLOGICAL: No headaches, memory loss, loss of strength, numbness, or tremors  MUSCULOSKELETAL: No joint pain or swelling; No muscle, back, or extremity pain      Vital Signs Last 24 Hrs  T(C): 36.8 (13 Apr 2023 11:30), Max: 36.9 (12 Apr 2023 11:39)  T(F): 98.3 (13 Apr 2023 11:30), Max: 98.4 (12 Apr 2023 11:39)  HR: 85 (13 Apr 2023 11:30) (85 - 102)  BP: 129/77 (13 Apr 2023 11:30) (108/64 - 149/91)  BP(mean): --  RR: 18 (13 Apr 2023 11:30) (18 - 19)  SpO2: 98% (13 Apr 2023 11:30) (97% - 98%)    Parameters below as of 13 Apr 2023 11:30  Patient On (Oxygen Delivery Method): room air        PHYSICAL EXAM:  GENERAL: NAD,   HEAD:  Atraumatic, Normocephalic  EYES: EOMI, PERRLA, conjunctiva and sclera clear  NECK: Supple, No JVD, Normal thyroid  NERVOUS SYSTEM:  Alert & Oriented X3, No gross focal deficits  CHEST/LUNG: Clear to percussion bilaterally; No rales, rhonchi, wheezing, or rubs  HEART: Regular rate and rhythm; No murmurs, rubs, or gallops  ABDOMEN: Soft, Nontender, Nondistended; Bowel sounds present, folye with bloody urine   EXTREMITIES:  No clubbing, cyanosis, or edema  SKIN: No rashes or lesions    LABS:                        10.4   7.80  )-----------( 310      ( 12 Apr 2023 09:38 )             33.3     04-12    141  |  105  |  17.4  ----------------------------<  83  4.3   |  24.0  |  0.88    Ca    9.6      12 Apr 2023 09:38  Phos  2.6     04-12  Mg     1.7     04-12    TPro  6.2<L>  /  Alb  3.7  /  TBili  0.2<L>  /  DBili  x   /  AST  20  /  ALT  11  /  AlkPhos  33<L>  04-12    PT/INR - ( 12 Apr 2023 03:15 )   PT: 14.1 sec;   INR: 1.21 ratio         PTT - ( 12 Apr 2023 03:15 )  PTT:41.6 sec    CAPILLARY BLOOD GLUCOSE          RADIOLOGY & ADDITIONAL TESTS:    Imaging Personally Reviewed:  [ ] YES  [ ] NO    Consultant(s) Notes Reviewed:  [x ] YES  [ ] NO    Care Discussed with Consultants/Other Providers [ ] YES  [ ] NO    Plan of Care discussed with Housestaff [ x]YES [ ] NO

## 2023-04-14 LAB
ANION GAP SERPL CALC-SCNC: 10 MMOL/L — SIGNIFICANT CHANGE UP (ref 5–17)
BASOPHILS # BLD AUTO: 0.05 K/UL — SIGNIFICANT CHANGE UP (ref 0–0.2)
BASOPHILS NFR BLD AUTO: 0.6 % — SIGNIFICANT CHANGE UP (ref 0–2)
BUN SERPL-MCNC: 13.2 MG/DL — SIGNIFICANT CHANGE UP (ref 8–20)
CALCIUM SERPL-MCNC: 9.2 MG/DL — SIGNIFICANT CHANGE UP (ref 8.4–10.5)
CHLORIDE SERPL-SCNC: 106 MMOL/L — SIGNIFICANT CHANGE UP (ref 96–108)
CO2 SERPL-SCNC: 25 MMOL/L — SIGNIFICANT CHANGE UP (ref 22–29)
CREAT SERPL-MCNC: 0.93 MG/DL — SIGNIFICANT CHANGE UP (ref 0.5–1.3)
EGFR: 86 ML/MIN/1.73M2 — SIGNIFICANT CHANGE UP
EOSINOPHIL # BLD AUTO: 0.29 K/UL — SIGNIFICANT CHANGE UP (ref 0–0.5)
EOSINOPHIL NFR BLD AUTO: 3.4 % — SIGNIFICANT CHANGE UP (ref 0–6)
GLUCOSE SERPL-MCNC: 125 MG/DL — HIGH (ref 70–99)
HCT VFR BLD CALC: 36.3 % — LOW (ref 39–50)
HGB BLD-MCNC: 11.1 G/DL — LOW (ref 13–17)
IMM GRANULOCYTES NFR BLD AUTO: 0.6 % — SIGNIFICANT CHANGE UP (ref 0–0.9)
LYMPHOCYTES # BLD AUTO: 1.08 K/UL — SIGNIFICANT CHANGE UP (ref 1–3.3)
LYMPHOCYTES # BLD AUTO: 12.8 % — LOW (ref 13–44)
MAGNESIUM SERPL-MCNC: 1.8 MG/DL — SIGNIFICANT CHANGE UP (ref 1.6–2.6)
MCHC RBC-ENTMCNC: 23.7 PG — LOW (ref 27–34)
MCHC RBC-ENTMCNC: 30.6 GM/DL — LOW (ref 32–36)
MCV RBC AUTO: 77.6 FL — LOW (ref 80–100)
MONOCYTES # BLD AUTO: 0.9 K/UL — SIGNIFICANT CHANGE UP (ref 0–0.9)
MONOCYTES NFR BLD AUTO: 10.7 % — SIGNIFICANT CHANGE UP (ref 2–14)
NEUTROPHILS # BLD AUTO: 6.07 K/UL — SIGNIFICANT CHANGE UP (ref 1.8–7.4)
NEUTROPHILS NFR BLD AUTO: 71.9 % — SIGNIFICANT CHANGE UP (ref 43–77)
PLATELET # BLD AUTO: 309 K/UL — SIGNIFICANT CHANGE UP (ref 150–400)
POTASSIUM SERPL-MCNC: 4.3 MMOL/L — SIGNIFICANT CHANGE UP (ref 3.5–5.3)
POTASSIUM SERPL-SCNC: 4.3 MMOL/L — SIGNIFICANT CHANGE UP (ref 3.5–5.3)
RBC # BLD: 4.68 M/UL — SIGNIFICANT CHANGE UP (ref 4.2–5.8)
RBC # FLD: 15.3 % — HIGH (ref 10.3–14.5)
SODIUM SERPL-SCNC: 141 MMOL/L — SIGNIFICANT CHANGE UP (ref 135–145)
WBC # BLD: 8.44 K/UL — SIGNIFICANT CHANGE UP (ref 3.8–10.5)
WBC # FLD AUTO: 8.44 K/UL — SIGNIFICANT CHANGE UP (ref 3.8–10.5)

## 2023-04-14 PROCEDURE — 99233 SBSQ HOSP IP/OBS HIGH 50: CPT

## 2023-04-14 RX ADMIN — Medication 650 MILLIGRAM(S): at 13:00

## 2023-04-14 RX ADMIN — ATORVASTATIN CALCIUM 40 MILLIGRAM(S): 80 TABLET, FILM COATED ORAL at 21:13

## 2023-04-14 RX ADMIN — Medication 650 MILLIGRAM(S): at 00:46

## 2023-04-14 RX ADMIN — FINASTERIDE 5 MILLIGRAM(S): 5 TABLET, FILM COATED ORAL at 11:05

## 2023-04-14 RX ADMIN — TAMSULOSIN HYDROCHLORIDE 0.4 MILLIGRAM(S): 0.4 CAPSULE ORAL at 21:13

## 2023-04-14 RX ADMIN — Medication 650 MILLIGRAM(S): at 17:11

## 2023-04-14 RX ADMIN — CEFTRIAXONE 1000 MILLIGRAM(S): 500 INJECTION, POWDER, FOR SOLUTION INTRAMUSCULAR; INTRAVENOUS at 05:06

## 2023-04-14 RX ADMIN — Medication 0.25 MILLIGRAM(S): at 21:13

## 2023-04-14 RX ADMIN — PANTOPRAZOLE SODIUM 40 MILLIGRAM(S): 20 TABLET, DELAYED RELEASE ORAL at 05:03

## 2023-04-14 RX ADMIN — Medication 25 MILLIGRAM(S): at 05:03

## 2023-04-14 RX ADMIN — Medication 650 MILLIGRAM(S): at 01:00

## 2023-04-14 RX ADMIN — Medication 650 MILLIGRAM(S): at 18:00

## 2023-04-14 RX ADMIN — Medication 145 MILLIGRAM(S): at 11:05

## 2023-04-14 RX ADMIN — Medication 0.25 MILLIGRAM(S): at 09:07

## 2023-04-14 RX ADMIN — LISINOPRIL 5 MILLIGRAM(S): 2.5 TABLET ORAL at 05:03

## 2023-04-14 RX ADMIN — Medication 25 MILLIGRAM(S): at 17:09

## 2023-04-14 RX ADMIN — Medication 650 MILLIGRAM(S): at 11:05

## 2023-04-14 NOTE — PROGRESS NOTE ADULT - ASSESSMENT
72M with PMHX HTN, HLD, BPH, RA on Leflunomide admitted for Recurrent Hematuria s/p CBI, UTI, and Hematoma.     #Recurrent hematuria 2/2 intravesicular hematoma  Restart CBI   UA (-) for WBC   Ceftriaxone started on admission, continue for 7 days   - urology consult noted   - monitor h and h   Get out of bed to chair       #BPH  - Flomax, Finasteride    #HTN- Essential   - monitor blood pressure  - Metoprolol    #HLD  - Statin, Fenofibrate    #RA  - Leflunomide- pt to have home medication brought    #DVT Prophylaxis   - venodynes    Plan discussed with patient, he is very angry and anxious. Says "i better have a heart attack"   Had extensive conversation regarding staying or leaving AMA   Xanax PRN for anxiety ordered

## 2023-04-14 NOTE — PROGRESS NOTE ADULT - SUBJECTIVE AND OBJECTIVE BOX
Subjective:72yMale with gross hematuria.  pt had recent craven placed at [facility] for urinary retention, here with clot retention, CBI had been started, hematuria improved, CBI held yesterday.    Craven: hematuria, dark fruit punch in color    Vital Signs Last 24 Hrs  T(C): 36.8 (14 Apr 2023 05:05), Max: 36.9 (13 Apr 2023 17:59)  T(F): 98.3 (14 Apr 2023 05:05), Max: 98.4 (13 Apr 2023 17:59)  HR: 81 (14 Apr 2023 05:05) (81 - 108)  BP: 128/73 (14 Apr 2023 05:05) (118/75 - 129/77)  BP(mean): --  RR: 18 (14 Apr 2023 05:05) (18 - 19)  SpO2: 96% (14 Apr 2023 05:05) (96% - 98%)    Parameters below as of 13 Apr 2023 17:59  Patient On (Oxygen Delivery Method): room air      I&O's Detail    13 Apr 2023 07:01  -  14 Apr 2023 07:00  --------------------------------------------------------  IN:    Continuous Bladder Irrigation (mL): 2900 mL    Lactated Ringers: 1500 mL    Oral Fluid: 200 mL  Total IN: 4600 mL    OUT:    Continuous Bladder Irrigation (mL): 3225 mL    Voided (mL): 1000 mL  Total OUT: 4225 mL    Total NET: 375 mL          Labs:                        11.1   8.44  )-----------( 309      ( 14 Apr 2023 05:24 )             36.3     04-14    141  |  106  |  13.2  ----------------------------<  125<H>  4.3   |  25.0  |  0.93    Ca    9.2      14 Apr 2023 05:24  Mg     1.8     04-14

## 2023-04-14 NOTE — PROGRESS NOTE ADULT - SUBJECTIVE AND OBJECTIVE BOX
Patient is a 72y old  Male who presents with a chief complaint of Hematuria (12 Apr 2023 09:56)      INTERVAL HPI/OVERNIGHT EVENTS: seen and examined. Still has gross hematuria  CBI was clumped since last night          MEDICATIONS  (STANDING):  atorvastatin 40 milliGRAM(s) Oral at bedtime  cefTRIAXone Injectable.      fenofibrate Tablet 145 milliGRAM(s) Oral daily  finasteride 5 milliGRAM(s) Oral daily  lactated ringers. 1000 milliLiter(s) (75 mL/Hr) IV Continuous <Continuous>  lisinopril 5 milliGRAM(s) Oral daily  metoprolol tartrate 25 milliGRAM(s) Oral two times a day  pantoprazole    Tablet 40 milliGRAM(s) Oral before breakfast  tamsulosin 0.4 milliGRAM(s) Oral at bedtime    MEDICATIONS  (PRN):  acetaminophen     Tablet .. 650 milliGRAM(s) Oral every 6 hours PRN Temp greater or equal to 38C (100.4F), Mild Pain (1 - 3)  aluminum hydroxide/magnesium hydroxide/simethicone Suspension 30 milliLiter(s) Oral every 4 hours PRN Dyspepsia  melatonin 3 milliGRAM(s) Oral at bedtime PRN Insomnia  ondansetron Injectable 4 milliGRAM(s) IV Push every 8 hours PRN Nausea and/or Vomiting      Allergies    No Known Allergies    Intolerances    Benadryl (Unknown)      REVIEW OF SYSTEMS:  CONSTITUTIONAL: No fever, weight loss, or fatigue  RESPIRATORY: No cough, wheezing, chills or hemoptysis; No shortness of breath  CARDIOVASCULAR: No chest pain, palpitations, dizziness, or leg swelling  GASTROINTESTINAL: No abdominal or epigastric pain. No nausea, vomiting, or hematemesis; No diarrhea or constipation. No melena or hematochezia.  NEUROLOGICAL: No headaches, memory loss, loss of strength, numbness, or tremors  MUSCULOSKELETAL: No joint pain or swelling; No muscle, back, or extremity pain      Vital Signs Last 24 Hrs  T(C): 36.8 (13 Apr 2023 11:30), Max: 36.9 (12 Apr 2023 11:39)  T(F): 98.3 (13 Apr 2023 11:30), Max: 98.4 (12 Apr 2023 11:39)  HR: 85 (13 Apr 2023 11:30) (85 - 102)  BP: 129/77 (13 Apr 2023 11:30) (108/64 - 149/91)  BP(mean): --  RR: 18 (13 Apr 2023 11:30) (18 - 19)  SpO2: 98% (13 Apr 2023 11:30) (97% - 98%)    Parameters below as of 13 Apr 2023 11:30  Patient On (Oxygen Delivery Method): room air        PHYSICAL EXAM:  GENERAL: NAD,   HEAD:  Atraumatic, Normocephalic  EYES: EOMI, PERRLA, conjunctiva and sclera clear  NECK: Supple, No JVD, Normal thyroid  NERVOUS SYSTEM:  Alert & Oriented X3, No gross focal deficits  CHEST/LUNG: Clear to percussion bilaterally; No rales, rhonchi, wheezing, or rubs  HEART: Regular rate and rhythm; No murmurs, rubs, or gallops  ABDOMEN: Soft, Nontender, Nondistended; Bowel sounds present, folye with bloody urine   EXTREMITIES:  No clubbing, cyanosis, or edema  SKIN: No rashes or lesions    LABS:                        10.4   7.80  )-----------( 310      ( 12 Apr 2023 09:38 )             33.3     04-12    141  |  105  |  17.4  ----------------------------<  83  4.3   |  24.0  |  0.88    Ca    9.6      12 Apr 2023 09:38  Phos  2.6     04-12  Mg     1.7     04-12    TPro  6.2<L>  /  Alb  3.7  /  TBili  0.2<L>  /  DBili  x   /  AST  20  /  ALT  11  /  AlkPhos  33<L>  04-12    PT/INR - ( 12 Apr 2023 03:15 )   PT: 14.1 sec;   INR: 1.21 ratio         PTT - ( 12 Apr 2023 03:15 )  PTT:41.6 sec    CAPILLARY BLOOD GLUCOSE          RADIOLOGY & ADDITIONAL TESTS:    Imaging Personally Reviewed:  [ ] YES  [ ] NO    Consultant(s) Notes Reviewed:  [x ] YES  [ ] NO    Care Discussed with Consultants/Other Providers [ ] YES  [ ] NO    Plan of Care discussed with Housestaff [ x]YES [ ] NO

## 2023-04-15 LAB
ANION GAP SERPL CALC-SCNC: 11 MMOL/L — SIGNIFICANT CHANGE UP (ref 5–17)
BASOPHILS # BLD AUTO: 0.04 K/UL — SIGNIFICANT CHANGE UP (ref 0–0.2)
BASOPHILS NFR BLD AUTO: 0.5 % — SIGNIFICANT CHANGE UP (ref 0–2)
BUN SERPL-MCNC: 14.9 MG/DL — SIGNIFICANT CHANGE UP (ref 8–20)
CALCIUM SERPL-MCNC: 9.3 MG/DL — SIGNIFICANT CHANGE UP (ref 8.4–10.5)
CHLORIDE SERPL-SCNC: 106 MMOL/L — SIGNIFICANT CHANGE UP (ref 96–108)
CO2 SERPL-SCNC: 22 MMOL/L — SIGNIFICANT CHANGE UP (ref 22–29)
CREAT SERPL-MCNC: 0.83 MG/DL — SIGNIFICANT CHANGE UP (ref 0.5–1.3)
EGFR: 92 ML/MIN/1.73M2 — SIGNIFICANT CHANGE UP
EOSINOPHIL # BLD AUTO: 0.41 K/UL — SIGNIFICANT CHANGE UP (ref 0–0.5)
EOSINOPHIL NFR BLD AUTO: 5 % — SIGNIFICANT CHANGE UP (ref 0–6)
GLUCOSE SERPL-MCNC: 104 MG/DL — HIGH (ref 70–99)
HCT VFR BLD CALC: 36.7 % — LOW (ref 39–50)
HGB BLD-MCNC: 11.7 G/DL — LOW (ref 13–17)
IMM GRANULOCYTES NFR BLD AUTO: 0.5 % — SIGNIFICANT CHANGE UP (ref 0–0.9)
LYMPHOCYTES # BLD AUTO: 1.17 K/UL — SIGNIFICANT CHANGE UP (ref 1–3.3)
LYMPHOCYTES # BLD AUTO: 14.4 % — SIGNIFICANT CHANGE UP (ref 13–44)
MAGNESIUM SERPL-MCNC: 1.8 MG/DL — SIGNIFICANT CHANGE UP (ref 1.8–2.6)
MCHC RBC-ENTMCNC: 24.4 PG — LOW (ref 27–34)
MCHC RBC-ENTMCNC: 31.9 GM/DL — LOW (ref 32–36)
MCV RBC AUTO: 76.6 FL — LOW (ref 80–100)
MONOCYTES # BLD AUTO: 0.68 K/UL — SIGNIFICANT CHANGE UP (ref 0–0.9)
MONOCYTES NFR BLD AUTO: 8.4 % — SIGNIFICANT CHANGE UP (ref 2–14)
NEUTROPHILS # BLD AUTO: 5.8 K/UL — SIGNIFICANT CHANGE UP (ref 1.8–7.4)
NEUTROPHILS NFR BLD AUTO: 71.2 % — SIGNIFICANT CHANGE UP (ref 43–77)
PLATELET # BLD AUTO: 324 K/UL — SIGNIFICANT CHANGE UP (ref 150–400)
POTASSIUM SERPL-MCNC: 4.3 MMOL/L — SIGNIFICANT CHANGE UP (ref 3.5–5.3)
POTASSIUM SERPL-SCNC: 4.3 MMOL/L — SIGNIFICANT CHANGE UP (ref 3.5–5.3)
RBC # BLD: 4.79 M/UL — SIGNIFICANT CHANGE UP (ref 4.2–5.8)
RBC # FLD: 15.1 % — HIGH (ref 10.3–14.5)
SODIUM SERPL-SCNC: 138 MMOL/L — SIGNIFICANT CHANGE UP (ref 135–145)
WBC # BLD: 8.14 K/UL — SIGNIFICANT CHANGE UP (ref 3.8–10.5)
WBC # FLD AUTO: 8.14 K/UL — SIGNIFICANT CHANGE UP (ref 3.8–10.5)

## 2023-04-15 PROCEDURE — 99231 SBSQ HOSP IP/OBS SF/LOW 25: CPT | Mod: FS

## 2023-04-15 PROCEDURE — 99232 SBSQ HOSP IP/OBS MODERATE 35: CPT

## 2023-04-15 RX ORDER — METHOCARBAMOL 500 MG/1
500 TABLET, FILM COATED ORAL EVERY 8 HOURS
Refills: 0 | Status: DISCONTINUED | OUTPATIENT
Start: 2023-04-15 | End: 2023-04-16

## 2023-04-15 RX ADMIN — Medication 145 MILLIGRAM(S): at 11:12

## 2023-04-15 RX ADMIN — CEFTRIAXONE 1000 MILLIGRAM(S): 500 INJECTION, POWDER, FOR SOLUTION INTRAMUSCULAR; INTRAVENOUS at 05:46

## 2023-04-15 RX ADMIN — Medication 0.25 MILLIGRAM(S): at 21:55

## 2023-04-15 RX ADMIN — Medication 650 MILLIGRAM(S): at 02:39

## 2023-04-15 RX ADMIN — Medication 25 MILLIGRAM(S): at 05:41

## 2023-04-15 RX ADMIN — Medication 3 MILLIGRAM(S): at 21:59

## 2023-04-15 RX ADMIN — SODIUM CHLORIDE 75 MILLILITER(S): 9 INJECTION, SOLUTION INTRAVENOUS at 13:08

## 2023-04-15 RX ADMIN — FINASTERIDE 5 MILLIGRAM(S): 5 TABLET, FILM COATED ORAL at 11:12

## 2023-04-15 RX ADMIN — TAMSULOSIN HYDROCHLORIDE 0.4 MILLIGRAM(S): 0.4 CAPSULE ORAL at 21:56

## 2023-04-15 RX ADMIN — Medication 650 MILLIGRAM(S): at 03:39

## 2023-04-15 RX ADMIN — LISINOPRIL 5 MILLIGRAM(S): 2.5 TABLET ORAL at 05:41

## 2023-04-15 RX ADMIN — PANTOPRAZOLE SODIUM 40 MILLIGRAM(S): 20 TABLET, DELAYED RELEASE ORAL at 05:41

## 2023-04-15 RX ADMIN — ATORVASTATIN CALCIUM 40 MILLIGRAM(S): 80 TABLET, FILM COATED ORAL at 21:56

## 2023-04-15 RX ADMIN — Medication 3 MILLIGRAM(S): at 02:39

## 2023-04-15 RX ADMIN — METHOCARBAMOL 500 MILLIGRAM(S): 500 TABLET, FILM COATED ORAL at 11:29

## 2023-04-15 RX ADMIN — Medication 25 MILLIGRAM(S): at 17:00

## 2023-04-15 RX ADMIN — SODIUM CHLORIDE 75 MILLILITER(S): 9 INJECTION, SOLUTION INTRAVENOUS at 05:55

## 2023-04-15 NOTE — PROGRESS NOTE ADULT - SUBJECTIVE AND OBJECTIVE BOX
UROLOGY F/U:    Patient seen and examined at bedside.  Awake, responsive resting comfortable in bed - CBI very slow drip.  tolerating PO well    Vital Signs Last 24 Hrs  T(C): 36.6 (15 Apr 2023 04:52), Max: 37.1 (14 Apr 2023 11:41)  T(F): 97.9 (15 Apr 2023 04:52), Max: 98.8 (14 Apr 2023 11:41)  HR: 96 (15 Apr 2023 04:52) (81 - 106)  BP: 125/71 (15 Apr 2023 04:52) (105/68 - 132/85)  RR: 18 (15 Apr 2023 04:52) (18 - 19)  SpO2: 94% (15 Apr 2023 04:52) (94% - 99%)    :  craven with CBI in progress noting very clear in tubing.  CBI clamped off at 8:00am will follow closely.     Labs:  cbc:                        11.7   8.14  )-----------( 324      ( 15 Apr 2023 05:26 )             36.7     Chemistry:  04-15    138  |  106  |  14.9  ----------------------------<  104<H>  4.3   |  22.0  |  0.83        Impression: stable no acute changes, hematuria cleared at present ; CBI very slow drip.  Discuss with Surgeon present situation and continued care  Plan: CBI clamped 8: am , continue to follow.

## 2023-04-15 NOTE — PROGRESS NOTE ADULT - ASSESSMENT
72M with PMHX HTN, HLD, BPH, RA on Leflunomide admitted for Recurrent Hematuria s/p CBI, UTI, and Hematoma.     #Recurrent hematuria 2/2 intravesicular hematoma  UA (-) for WBC   - Ceftriaxone started on admission, continue for 7 days   - urology consult noted  - monitor h and h  - Get out of bed to chair    #BPH  - Flomax, Finasteride    #HTN - Essential   - monitor blood pressure  - Metoprolol    #HLD  - Statin, Fenofibrate    #RA  - Leflunomide - pt to have home medication brought    #DVT Prophylaxis   - venodynes    Xanax PRN for anxiety ordered    possible DC today; needs PT and OOB

## 2023-04-15 NOTE — PROGRESS NOTE ADULT - SUBJECTIVE AND OBJECTIVE BOX
CC: back pain    INTERVAL HPI/OVERNIGHT EVENTS:  no acute events overnight  complains of muscle pain    Vital Signs Last 24 Hrs  T(C): 36.6 (15 Apr 2023 04:52), Max: 37.1 (14 Apr 2023 11:41)  T(F): 97.9 (15 Apr 2023 04:52), Max: 98.8 (14 Apr 2023 11:41)  HR: 96 (15 Apr 2023 04:52) (81 - 106)  BP: 125/71 (15 Apr 2023 04:52) (105/68 - 132/85)  BP(mean): --  RR: 18 (15 Apr 2023 04:52) (18 - 19)  SpO2: 94% (15 Apr 2023 04:52) (94% - 99%)    Parameters below as of 15 Apr 2023 04:52  Patient On (Oxygen Delivery Method): room air    PHYSICAL EXAM:  General: in no acute distress  Eyes: PERRLA, EOMI; conjunctiva and sclera clear  Head: Normocephalic; atraumatic  ENMT: No nasal discharge; airway clear  Neck: Supple; non tender; no masses  Respiratory: No wheezes, rales or rhonchi  Cardiovascular: Regular rate and rhythm. S1 and S2 Normal; No murmurs, gallops or rubs  Gastrointestinal: Soft non-tender non-distended; Normal bowel sounds  Genitourinary: No costovertebral angle tenderness; urine appears clear in craven; CBI clamped currently  Extremities: Normal range of motion, No clubbing, cyanosis or edema  Vascular: Peripheral pulses palpable 2+ bilaterally  Neurological: Alert and oriented x4  Skin: Warm and dry. No acute rash  Psychiatric: Cooperative and appropriate    I&O's Detail    14 Apr 2023 07:01  -  15 Apr 2023 07:00  --------------------------------------------------------  IN:    Continuous Bladder Irrigation (mL): 41014 mL  Total IN: 06621 mL    OUT:    Continuous Bladder Irrigation (mL): 82257 mL    Voided (mL): 700 mL  Total OUT: 79319 mL    Total NET: -9950 mL               11.7   8.14  )-----------( 324      ( 15 Apr 2023 05:26 )             36.7     15 Apr 2023 05:26    138    |  106    |  14.9   ----------------------------<  104    4.3     |  22.0   |  0.83     Ca    9.3        15 Apr 2023 05:26  Mg     1.8       15 Apr 2023 05:26    CAPILLARY BLOOD GLUCOSE    MEDICATIONS  (STANDING):  atorvastatin 40 milliGRAM(s) Oral at bedtime  cefTRIAXone Injectable.      cefTRIAXone Injectable. 1000 milliGRAM(s) IV Push every 24 hours  fenofibrate Tablet 145 milliGRAM(s) Oral daily  finasteride 5 milliGRAM(s) Oral daily  lactated ringers. 1000 milliLiter(s) (75 mL/Hr) IV Continuous <Continuous>  lisinopril 5 milliGRAM(s) Oral daily  metoprolol tartrate 25 milliGRAM(s) Oral two times a day  pantoprazole    Tablet 40 milliGRAM(s) Oral before breakfast  tamsulosin 0.4 milliGRAM(s) Oral at bedtime    MEDICATIONS  (PRN):  acetaminophen     Tablet .. 650 milliGRAM(s) Oral every 6 hours PRN Temp greater or equal to 38C (100.4F), Mild Pain (1 - 3)  ALPRAZolam 0.25 milliGRAM(s) Oral every 8 hours PRN anxiety  aluminum hydroxide/magnesium hydroxide/simethicone Suspension 30 milliLiter(s) Oral every 4 hours PRN Dyspepsia  melatonin 3 milliGRAM(s) Oral at bedtime PRN Insomnia  ondansetron Injectable 4 milliGRAM(s) IV Push every 8 hours PRN Nausea and/or Vomiting    RADIOLOGY & ADDITIONAL TESTS:

## 2023-04-15 NOTE — CHART NOTE - NSCHARTNOTEFT_GEN_A_CORE
Urology f/u:    Sandoval upsized in ER to 22F 3-way with CBI in progress, seen at bedside urine light pink free flowing no clots appreciated.  Titrated rate to slow trickle- follow closely/    Impression:  hematuria resolving , presently on CBI  ( rate just decreased )   Plan:  continue same, rate titrated down to slow trickle- follow next hour if continues to improve possibly discontinue CBI and follow output.
Follow up CBI:  Patient with girlfriend at bedside:  Patient awake, responsive in no acute distress  :  craven to BSD noted off CBI since 8:00am clamped and then follow up 14:45 pm noted urine yellow, and craven to BSD.    Impression: hematuria resolved  Plan: continue present care and management discussed with Surgeon home with craven :  Urologically patient os ok for discharge to follow up with his urologist as outpatient .
Interval Hx: Called by RN for patient with "Charley horse" muscle cramps in legs. Patient is a 72 year old male with a PMH of HTN, HLD, BPH, RA, admitted for recurrent hematuria, seen for charley horse muscle cramps in b/l legs and lower back pain. Patient states that he has a history of sciatica and usually has improvement with ibuprofen or Tylenol at home. He noted numbness and tingling in his left foot for a brief episode, now resolved. Denies urinary incontinence, stool incontinence, paraesthesias.     Vitals  T(C): 36.6 (04-12-23 @ 18:00), Max: 36.9 (04-12-23 @ 11:39)  HR: 99 (04-12-23 @ 18:00) (83 - 102)  BP: 126/75 (04-12-23 @ 18:00) (126/75 - 177/80)  RR: 19 (04-12-23 @ 18:00) (18 - 19)  SpO2: 97% (04-12-23 @ 18:00) (97% - 98%)    Physical Exam  CONSTITUTIONAL: Patient laying in bed, no apparent distress  MSK: examination of the RLE and LLE without misalignment,            Normal ROM without pain, no spinal tenderness, normal muscle strength/tone, negative straight leg raise  NEURO: CN II-XII intact; sensation intact in upper and lower extremities b/l to light touch     A/P: 72 year old male with a PMH of HTN, HLD, BPH, RA, admitted for recurrent hematuria, seen for charley horse muscle cramps in b/l legs and lower back pain. VSS    Muscle spasms in b/l LE   -methocarbamol 500mg PO x1     Lower back pain   -Tylenol PRN     Will continue to monitor   RN to notify provider with any changes in patient status.

## 2023-04-16 ENCOUNTER — TRANSCRIPTION ENCOUNTER (OUTPATIENT)
Age: 73
End: 2023-04-16

## 2023-04-16 VITALS
TEMPERATURE: 98 F | HEART RATE: 88 BPM | RESPIRATION RATE: 17 BRPM | OXYGEN SATURATION: 96 % | DIASTOLIC BLOOD PRESSURE: 77 MMHG | SYSTOLIC BLOOD PRESSURE: 121 MMHG

## 2023-04-16 PROCEDURE — 74177 CT ABD & PELVIS W/CONTRAST: CPT | Mod: MA

## 2023-04-16 PROCEDURE — 86901 BLOOD TYPING SEROLOGIC RH(D): CPT

## 2023-04-16 PROCEDURE — 84100 ASSAY OF PHOSPHORUS: CPT

## 2023-04-16 PROCEDURE — 36415 COLL VENOUS BLD VENIPUNCTURE: CPT

## 2023-04-16 PROCEDURE — 80053 COMPREHEN METABOLIC PANEL: CPT

## 2023-04-16 PROCEDURE — 87637 SARSCOV2&INF A&B&RSV AMP PRB: CPT

## 2023-04-16 PROCEDURE — 80048 BASIC METABOLIC PNL TOTAL CA: CPT

## 2023-04-16 PROCEDURE — 86850 RBC ANTIBODY SCREEN: CPT

## 2023-04-16 PROCEDURE — 81001 URINALYSIS AUTO W/SCOPE: CPT

## 2023-04-16 PROCEDURE — 85730 THROMBOPLASTIN TIME PARTIAL: CPT

## 2023-04-16 PROCEDURE — 99283 EMERGENCY DEPT VISIT LOW MDM: CPT

## 2023-04-16 PROCEDURE — 99285 EMERGENCY DEPT VISIT HI MDM: CPT

## 2023-04-16 PROCEDURE — 51702 INSERT TEMP BLADDER CATH: CPT

## 2023-04-16 PROCEDURE — 99239 HOSP IP/OBS DSCHRG MGMT >30: CPT

## 2023-04-16 PROCEDURE — 85025 COMPLETE CBC W/AUTO DIFF WBC: CPT

## 2023-04-16 PROCEDURE — 99231 SBSQ HOSP IP/OBS SF/LOW 25: CPT | Mod: FS

## 2023-04-16 PROCEDURE — 85610 PROTHROMBIN TIME: CPT

## 2023-04-16 PROCEDURE — 86803 HEPATITIS C AB TEST: CPT

## 2023-04-16 PROCEDURE — 87086 URINE CULTURE/COLONY COUNT: CPT

## 2023-04-16 PROCEDURE — 86900 BLOOD TYPING SEROLOGIC ABO: CPT

## 2023-04-16 PROCEDURE — 83735 ASSAY OF MAGNESIUM: CPT

## 2023-04-16 RX ORDER — ACETAMINOPHEN 500 MG
2 TABLET ORAL
Qty: 0 | Refills: 0 | DISCHARGE
Start: 2023-04-16

## 2023-04-16 RX ORDER — LEFLUNOMIDE 10 MG/1
1 TABLET ORAL
Refills: 0 | DISCHARGE

## 2023-04-16 RX ADMIN — PANTOPRAZOLE SODIUM 40 MILLIGRAM(S): 20 TABLET, DELAYED RELEASE ORAL at 05:12

## 2023-04-16 RX ADMIN — Medication 650 MILLIGRAM(S): at 03:21

## 2023-04-16 RX ADMIN — Medication 145 MILLIGRAM(S): at 13:02

## 2023-04-16 RX ADMIN — Medication 25 MILLIGRAM(S): at 05:12

## 2023-04-16 RX ADMIN — CEFTRIAXONE 1000 MILLIGRAM(S): 500 INJECTION, POWDER, FOR SOLUTION INTRAMUSCULAR; INTRAVENOUS at 05:11

## 2023-04-16 RX ADMIN — Medication 650 MILLIGRAM(S): at 04:21

## 2023-04-16 RX ADMIN — FINASTERIDE 5 MILLIGRAM(S): 5 TABLET, FILM COATED ORAL at 13:03

## 2023-04-16 RX ADMIN — LISINOPRIL 5 MILLIGRAM(S): 2.5 TABLET ORAL at 05:12

## 2023-04-16 NOTE — DISCHARGE NOTE PROVIDER - NSDCCPCAREPLAN_GEN_ALL_CORE_FT
PRINCIPAL DISCHARGE DIAGNOSIS  Diagnosis: Hematuria  Assessment and Plan of Treatment: now resolved. can continue aspirin. follow up with primary urologist. complete course of antibiotics

## 2023-04-16 NOTE — DISCHARGE NOTE PROVIDER - NSDCFUSCHEDAPPT_GEN_ALL_CORE_FT
Hernandez Lal  [facility]  RHEUM [address]  Scheduled Appointment: 05/30/2023

## 2023-04-16 NOTE — DISCHARGE NOTE NURSING/CASE MANAGEMENT/SOCIAL WORK - NSDCPEFALRISK_GEN_ALL_CORE
For information on Fall & Injury Prevention, visit: [email or web address] OR  [email or web address] OR  https://www.cdc.gov/steadi/patient.html

## 2023-04-16 NOTE — DISCHARGE NOTE NURSING/CASE MANAGEMENT/SOCIAL WORK - PATIENT PORTAL LINK FT
You can access the FollowMyHealth Patient Portal offered by [facility] by registering at the following website: [email or web address]. By joining [email or web address]’s FollowMyHealth portal, you will also be able to view your health information using other applications (apps) compatible with our system.

## 2023-04-16 NOTE — DISCHARGE NOTE PROVIDER - NSDCMRMEDTOKEN_GEN_ALL_CORE_FT
acetaminophen 325 mg oral tablet: 2 tab(s) orally every 6 hours As needed Temp greater or equal to 38C (100.4F), Mild Pain (1 - 3)  aspirin 81 mg oral tablet: 1 orally every other day  cephalexin 500 mg oral capsule: 1 cap(s) orally 4 times a day  fenofibrate 134 mg oral capsule: 1 orally once a day  finasteride 5 mg oral tablet: 1 orally once a day  Lipitor 40 mg oral tablet: 1 orally once a day (at bedtime)  lisinopril 5 mg oral tablet: 1 orally once a day  Metoprolol Tartrate 25 mg oral tablet: 1 orally 2 times a day  pantoprazole 40 mg oral delayed release tablet: 1 orally once a day  tamsulosin 0.4 mg oral capsule: 1 orally once a day (at bedtime)

## 2023-04-16 NOTE — DISCHARGE NOTE PROVIDER - ATTENDING DISCHARGE PHYSICAL EXAMINATION:
Vital Signs Last 24 Hrs  T(C): 36.6 (04-16-23 @ 04:20), Max: 37 (04-15-23 @ 16:20)  T(F): 97.9 (04-16-23 @ 04:20), Max: 98.6 (04-15-23 @ 16:20)  HR: 89 (04-16-23 @ 04:20) (86 - 94)  BP: 122/77 (04-16-23 @ 04:20) (121/75 - 129/79)  BP(mean): --  RR: 18 (04-16-23 @ 04:20) (18 - 18)  SpO2: 94% (04-16-23 @ 04:20) (94% - 97%)

## 2023-04-16 NOTE — PROGRESS NOTE ADULT - NS ATTEND AMEND GEN_ALL_CORE FT
hematuria has resolved.   stable for discharge from  perspective.
Urine still yellow for last two plus hours.   WIll keep CBI off. Ambulate.   If his urine remains clear then will discharge later today.   Follow up with his primary urologist.
Agree with above  Hct is stable  slow cbi until clear

## 2023-04-16 NOTE — PROGRESS NOTE ADULT - SUBJECTIVE AND OBJECTIVE BOX
Urology f/u:    Patient seen and examined with Dr. Banuelos at bedside.  resting comfortable no acute distress.    Vital Signs Last 24 Hrs  T(C): 36.9 (16 Apr 2023 10:42), Max: 37 (15 Apr 2023 16:20)  T(F): 98.4 (16 Apr 2023 10:42), Max: 98.6 (15 Apr 2023 16:20)  HR: 92 (16 Apr 2023 10:42) (86 - 94)  BP: 111/77 (16 Apr 2023 10:42) (111/77 - 129/79)  RR: 18 (16 Apr 2023 10:42) (18 - 18)  SpO2: 97% (16 Apr 2023 10:42) (94% - 97%)    :  craven catheter in place off CBI 24 hrs. Noted adequate urine output with urine yellow - resolution of hematuria at present.   Patient aware will go home with craven and follow up with his private urologist.        Impression: hematuria, clot retention, resolved urine yellow/clear  Plan: Continue present care and management with Medicine ok for discharge with craven.  Patient aware going home with craven and he wants to follow up with his Urologist has appointment this upcoming week.

## 2023-04-16 NOTE — DISCHARGE NOTE PROVIDER - HOSPITAL COURSE
HPI:  72M with PMHX HTN, HLD, BPH, RA on Leflunomide presents to [facility] ER c/o hematuria and clogged craven catheter. Patient initially presented 2 weeks ago to [facility] for urinary retention had craven placed then seen at [facility] ER on 4/9 for urination around craven cathter and dx UTI placed on Cephelexin now with inability to urinate since 6PM and Hematuria for 4 days with suprapubic ttp. Appt with Urology this afternoon but unable to make it due to ongoing hematuria and he presented to the ED.    Patient was admitted and seen in consultation with urology. Started on CBI. Patient urine cleared and CBI was held. At this time patient stable for discharge home with follow up with his regular urologist in [facility].

## 2023-04-17 ENCOUNTER — NON-APPOINTMENT (OUTPATIENT)
Age: 73
End: 2023-04-17

## 2023-04-24 ENCOUNTER — NON-APPOINTMENT (OUTPATIENT)
Age: 73
End: 2023-04-24

## 2023-05-22 ENCOUNTER — RX RENEWAL (OUTPATIENT)
Age: 73
End: 2023-05-22

## 2023-05-22 RX ORDER — LEFLUNOMIDE 20 MG/1
20 TABLET, FILM COATED ORAL
Qty: 90 | Refills: 1 | Status: ACTIVE | COMMUNITY
Start: 2023-02-27 | End: 1900-01-01

## 2023-05-25 ENCOUNTER — NON-APPOINTMENT (OUTPATIENT)
Age: 73
End: 2023-05-25

## 2023-05-30 ENCOUNTER — APPOINTMENT (OUTPATIENT)
Dept: RHEUMATOLOGY | Facility: CLINIC | Age: 73
End: 2023-05-30

## 2023-07-07 ENCOUNTER — NON-APPOINTMENT (OUTPATIENT)
Age: 73
End: 2023-07-07

## 2023-07-19 ENCOUNTER — APPOINTMENT (OUTPATIENT)
Dept: FAMILY MEDICINE | Facility: CLINIC | Age: 73
End: 2023-07-19
Payer: MEDICARE

## 2023-07-19 VITALS
DIASTOLIC BLOOD PRESSURE: 70 MMHG | HEART RATE: 71 BPM | SYSTOLIC BLOOD PRESSURE: 120 MMHG | BODY MASS INDEX: 24.25 KG/M2 | HEIGHT: 68 IN | OXYGEN SATURATION: 99 % | TEMPERATURE: 97.9 F | WEIGHT: 160 LBS

## 2023-07-19 DIAGNOSIS — K40.90 UNILATERAL INGUINAL HERNIA, W/OUT OBSTRUCTION OR GANGRENE, NOT SPECIFIED AS RECURRENT: ICD-10-CM

## 2023-07-19 DIAGNOSIS — R19.7 DIARRHEA, UNSPECIFIED: ICD-10-CM

## 2023-07-19 PROCEDURE — 99214 OFFICE O/P EST MOD 30 MIN: CPT | Mod: 25

## 2023-07-19 PROCEDURE — 36415 COLL VENOUS BLD VENIPUNCTURE: CPT

## 2023-07-19 RX ORDER — ATORVASTATIN CALCIUM 40 MG/1
40 TABLET, FILM COATED ORAL
Refills: 0 | Status: DISCONTINUED | COMMUNITY
End: 2023-07-19

## 2023-07-19 RX ORDER — PREDNISONE 10 MG/1
10 TABLET ORAL
Qty: 240 | Refills: 0 | Status: DISCONTINUED | COMMUNITY
Start: 2022-11-03 | End: 2023-07-19

## 2023-07-19 RX ORDER — CLONAZEPAM 0.5 MG/1
0.5 TABLET ORAL
Qty: 90 | Refills: 0 | Status: DISCONTINUED | COMMUNITY
Start: 2020-02-18 | End: 2023-07-19

## 2023-07-19 NOTE — HISTORY OF PRESENT ILLNESS
[de-identified] : 73 y/o with pmhx of  CAD s/p MI (2003 no stents), Carotid artery stenosis, HLD, HTN, GERD, BPH presents for follow up. \par \par For BPH-  patient was at [facility] due to urinary retention in setting of bph in april 2023. Patient is now following with doctor aga. No longer has catheter.  He is currently on finasteride 5mg daily and tamsulosin .8mg daily. He is still on afluzosin 10mg daily. \par \par For diffuse muscle/joint pain - was seen by rheum Dr. Lal due to elevated inflammatory markers and positive cindy. Was diagnosed with polymyalgia rheumatica. Currently stable on leflunomide daily\par \par For hx of CAD - Patient is following with cardiology Dr. Cachorro Carr\par \par Patient has been having more frequent bowel movements. States some times it is normal but at other times it is diarrhea. Denies any blood  in the stool. Denies constipation. States it improves with high fiber foods\par \par Patient has noticed a left inguinal lump. Denies any pain.

## 2023-07-19 NOTE — PHYSICAL EXAM
[No Edema] : there was no peripheral edema [Coordination Grossly Intact] : coordination grossly intact [No Focal Deficits] : no focal deficits [Normal Gait] : normal gait [Normal] : affect was normal and insight and judgment were intact [de-identified] : left inguinal hernia

## 2023-07-19 NOTE — ASSESSMENT
[FreeTextEntry1] : Anxiety\par patient with increased anxiety, he is interested in starting medication\par discussed harmful risks of benzos including risk of cognitive decline/dementia with long term benzo use, increased risk of hip fractures, car accidents, and risk of substance abuse/dependence, advised that benzos are not an appropriate first line treatment for anxiety\par will start zoloft 25mg daily for 1 week then increase to 50mg daily\par follow up in 1 month\par \par BPH\par continue alfuzosin 10mg daily, finasteride 5mg daily, and tamsulosin .8mg daily as prescribed by urology\par will request records from Dr. Cintron urology\par \par Left inguinal hernia\par ED precautions provided\par will refer to general surgery for evaluation\par \par Diarrhea\par recommend daily fiber supplement with benefiber or metamucil\par recommend follow up with gi for repeat screening colonoscopy as it is due\par \par HTN:\par BP stable at todays visit\par -will continue lisinopril 5mg daily and meotprolol tartrate 25mg BID\par -Patient advised to continue following a healthy meal plan with less salt and more fruits and vegetables, in addition to increasing physical activity.\par -will check cbc and cmp\par -patient to follow up in 6 months or as needed.\par \par HLD\par continue atorvastatin 40mg daily\par \par Polymyalgia rheumatica\par continue leflunomide 20mg daily\par follow up with rheumatology

## 2023-07-20 ENCOUNTER — NON-APPOINTMENT (OUTPATIENT)
Age: 73
End: 2023-07-20

## 2023-07-20 LAB
ALBUMIN SERPL ELPH-MCNC: 4.5 G/DL
ALP BLD-CCNC: 35 U/L
ALT SERPL-CCNC: 18 U/L
ANION GAP SERPL CALC-SCNC: 11 MMOL/L
AST SERPL-CCNC: 25 U/L
BILIRUB SERPL-MCNC: 0.2 MG/DL
BUN SERPL-MCNC: 15 MG/DL
CALCIUM SERPL-MCNC: 9.7 MG/DL
CHLORIDE SERPL-SCNC: 108 MMOL/L
CO2 SERPL-SCNC: 22 MMOL/L
CREAT SERPL-MCNC: 1.05 MG/DL
EGFR: 74 ML/MIN/1.73M2
GLUCOSE SERPL-MCNC: 103 MG/DL
POTASSIUM SERPL-SCNC: 4.4 MMOL/L
PROT SERPL-MCNC: 6.9 G/DL
SODIUM SERPL-SCNC: 141 MMOL/L

## 2023-07-31 ENCOUNTER — APPOINTMENT (OUTPATIENT)
Dept: SURGERY | Facility: CLINIC | Age: 73
End: 2023-07-31
Payer: MEDICARE

## 2023-07-31 VITALS
TEMPERATURE: 98.1 F | HEART RATE: 75 BPM | BODY MASS INDEX: 26.01 KG/M2 | WEIGHT: 158 LBS | OXYGEN SATURATION: 95 % | DIASTOLIC BLOOD PRESSURE: 60 MMHG | RESPIRATION RATE: 16 BRPM | SYSTOLIC BLOOD PRESSURE: 111 MMHG | HEIGHT: 65.5 IN

## 2023-07-31 PROCEDURE — 99204 OFFICE O/P NEW MOD 45 MIN: CPT

## 2023-08-02 NOTE — PHYSICAL EXAM
[No Rash or Lesion] : No rash or lesion [Alert] : alert [Oriented to Person] : oriented to person [Oriented to Place] : oriented to place [Oriented to Time] : oriented to time [Calm] : calm [JVD] : no jugular venous distention  [de-identified] : No acute distress [de-identified] : No respiratory distress [de-identified] : Regular rate [de-identified] : soft, nontender. no rebound or guarding. palpable left inguinal hernia - reducible, nontender. no right sided defect appreciable. umbilical hernia ~1cm defect without contents [de-identified] : normal range of motion

## 2023-08-02 NOTE — HISTORY OF PRESENT ILLNESS
[de-identified] : Mr. CALLES is a 73 year old man with left groin bulge, referred by Dr. Falcon for evaluation. Reports rare instances of pain at left groin. Denies fever/chills/nausea/emesis or changes in bowel or bladder habits. Tolerating diet. Normal bowel movements.  denies smoking

## 2023-08-02 NOTE — CONSULT LETTER
[Dear  ___] : Dear  [unfilled], [Consult Letter:] : I had the pleasure of evaluating your patient, [unfilled]. [Please see my note below.] : Please see my note below. [Consult Closing:] : Thank you very much for allowing me to participate in the care of this patient.  If you have any questions, please do not hesitate to contact me. [Sincerely,] : Sincerely, [FreeTextEntry3] : Antonio Sow MD, FACS Director of Bariatric Surgery [facility]  Assistant Professor of Surgery  [employer] at [facility]

## 2023-08-02 NOTE — ASSESSMENT
[FreeTextEntry1] : Mr. CALLES is a 73 year old man with left inguinal hernia and umbilical hernia. We discussed the options of robotic/laparoscopic repair, open repair, and nonoperative management. The risks/benefits and alternatives were discussed at length and all questions were answered. We discussed the risks and benefits of the use of mesh. The patient appears to understand and declines surgical intervention at this time. He wishes to return to office in 3 months for reevaluation.   The patient understands and agrees that if he experiences pain, fever/chills/nausea/emesis or other symptoms he will contact the office or present to the ED immediately.

## 2023-08-18 ENCOUNTER — APPOINTMENT (OUTPATIENT)
Dept: FAMILY MEDICINE | Facility: CLINIC | Age: 73
End: 2023-08-18
Payer: MEDICARE

## 2023-08-18 VITALS
TEMPERATURE: 97.9 F | WEIGHT: 158 LBS | SYSTOLIC BLOOD PRESSURE: 127 MMHG | HEIGHT: 65.5 IN | BODY MASS INDEX: 26.01 KG/M2 | DIASTOLIC BLOOD PRESSURE: 78 MMHG | HEART RATE: 77 BPM | RESPIRATION RATE: 16 BRPM | OXYGEN SATURATION: 98 %

## 2023-08-18 DIAGNOSIS — E78.5 HYPERLIPIDEMIA, UNSPECIFIED: ICD-10-CM

## 2023-08-18 DIAGNOSIS — I10 ESSENTIAL (PRIMARY) HYPERTENSION: ICD-10-CM

## 2023-08-18 DIAGNOSIS — M35.3 POLYMYALGIA RHEUMATICA: ICD-10-CM

## 2023-08-18 DIAGNOSIS — N40.0 BENIGN PROSTATIC HYPERPLASIA WITHOUT LOWER URINARY TRACT SYMPMS: ICD-10-CM

## 2023-08-18 DIAGNOSIS — F41.9 ANXIETY DISORDER, UNSPECIFIED: ICD-10-CM

## 2023-08-18 PROCEDURE — 99214 OFFICE O/P EST MOD 30 MIN: CPT

## 2023-08-18 NOTE — PHYSICAL EXAM
[No Edema] : there was no peripheral edema [Coordination Grossly Intact] : coordination grossly intact [No Focal Deficits] : no focal deficits [Normal Gait] : normal gait [Normal] : affect was normal and insight and judgment were intact [de-identified] : left inguinal hernia

## 2023-08-18 NOTE — ASSESSMENT
[FreeTextEntry1] : Anxiety patient did not start zoloft as he did not want to take another pill every day, recommend trying zoloft for treatment of anxiety again discussed harmful risks of benzos including risk of cognitive decline/dementia with long term benzo use, increased risk of hip fractures, car accidents, and risk of substance abuse/dependence, advised that benzos are not an appropriate first line treatment for anxiety  BPH continue alfuzosin 10mg daily, finasteride 5mg daily, and tamsulosin .8mg daily as prescribed by urology follow up with Dr. Cintron urology  Left inguinal hernia and umbilical hernia was evaluated by Dr. Sow surgery  HTN: BP stable at todays visit -will continue lisinopril 5mg daily and meotprolol tartrate 25mg BID -Patient advised to continue following a healthy meal plan with less salt and more fruits and vegetables, in addition to increasing physical activity.  HLD continue atorvastatin 40mg daily  Polymyalgia rheumatica continue leflunomide 20mg daily follow up with rheumatology

## 2023-08-18 NOTE — HISTORY OF PRESENT ILLNESS
[FreeTextEntry1] : 1 month follow up [de-identified] : 72 y/o with pmhx of  CAD s/p MI (2003 no stents), Carotid artery stenosis, HLD, HTN, GERD, BPH presents for follow up.   For BPH- stable, patient following with doctor aga. No longer has catheter.  He is currently on finasteride 5mg daily and tamsulosin .8mg daily. He is still on afluzosin 10mg daily.   For diffuse muscle/joint pain - was seen by rheum Dr. Lal due to elevated inflammatory markers and positive cindy. Was diagnosed with polymyalgia rheumatica. Currently stable on leflunomide daily  For hx of CAD - Patient is following with cardiology Dr. Cachorro Carr  Patient with episodes of anxiety 2-3 times per week. At last visit was started on sertraline 50mg daily, however patient states he did not start it. He is once again asking if a benzodiazepine to be used as needed for anxiety instead can be prescribed

## 2023-09-14 NOTE — DISCHARGE NOTE NURSING/CASE MANAGEMENT/SOCIAL WORK - NSDCVIVACCINE_GEN_ALL_CORE_FT
Refill Decision Note   Veronique Johnson  is requesting a refill authorization.  Brief Assessment and Rationale for Refill:  Approve     Medication Therapy Plan:         Comments:     Note composed:1:24 PM 09/14/2023             Appointments     Last Visit   7/13/2023 Becky Song MD   Next Visit   1/16/2024 Becky Song MD        No Vaccines Administered.

## 2023-09-15 ENCOUNTER — RX RENEWAL (OUTPATIENT)
Age: 73
End: 2023-09-15

## 2023-09-15 RX ORDER — SERTRALINE HYDROCHLORIDE 50 MG/1
50 TABLET, FILM COATED ORAL
Qty: 60 | Refills: 1 | Status: ACTIVE | COMMUNITY
Start: 2023-07-19 | End: 1900-01-01
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
## 2023-11-10 ENCOUNTER — APPOINTMENT (OUTPATIENT)
Dept: SURGERY | Facility: CLINIC | Age: 73
End: 2023-11-10
Payer: MEDICARE

## 2023-11-10 VITALS
RESPIRATION RATE: 16 BRPM | SYSTOLIC BLOOD PRESSURE: 129 MMHG | TEMPERATURE: 97.6 F | HEIGHT: 65.5 IN | OXYGEN SATURATION: 97 % | DIASTOLIC BLOOD PRESSURE: 74 MMHG | BODY MASS INDEX: 27 KG/M2 | WEIGHT: 164 LBS | HEART RATE: 83 BPM

## 2023-11-10 PROCEDURE — 99214 OFFICE O/P EST MOD 30 MIN: CPT

## 2023-12-19 ENCOUNTER — NON-APPOINTMENT (OUTPATIENT)
Age: 73
End: 2023-12-19

## 2024-02-09 ENCOUNTER — RX RENEWAL (OUTPATIENT)
Age: 74
End: 2024-02-09

## 2024-02-09 RX ORDER — METOPROLOL TARTRATE 25 MG/1
25 TABLET, FILM COATED ORAL
Qty: 180 | Refills: 3 | Status: ACTIVE | COMMUNITY
Start: 2019-07-16 | End: 1900-01-01

## 2024-02-09 RX ORDER — LISINOPRIL 5 MG/1
5 TABLET ORAL DAILY
Qty: 90 | Refills: 3 | Status: ACTIVE | COMMUNITY
Start: 1900-01-01 | End: 1900-01-01

## 2024-02-09 RX ORDER — ATORVASTATIN CALCIUM 40 MG/1
40 TABLET, FILM COATED ORAL
Qty: 90 | Refills: 1 | Status: ACTIVE | COMMUNITY
Start: 2019-07-16 | End: 1900-01-01

## 2024-02-12 RX ORDER — PANTOPRAZOLE 40 MG/1
40 TABLET, DELAYED RELEASE ORAL
Qty: 90 | Refills: 1 | Status: ACTIVE | COMMUNITY
Start: 2019-07-16 | End: 1900-01-01

## 2024-06-05 NOTE — ED PROVIDER NOTE - DISPOSITION TYPE
Returned call to patient, pt states he is awaiting surgery scheduling with Dr. Calabrese, we did receive a fax from Dr. Calabrese's office for Medical Clearance but Dr. Alba does not provide Medical Clearance for procedures. Pt states his PCP, who is also his Cardiologist, gave clearance yesterday so he is not sure what is needed from Dr. Alba.    I called Marixa at Dr. Calabrese's office, left a voice mail on her phone with call back number.   ADMIT

## 2024-06-07 ENCOUNTER — APPOINTMENT (OUTPATIENT)
Dept: SURGERY | Facility: CLINIC | Age: 74
End: 2024-06-07

## 2024-06-07 VITALS
OXYGEN SATURATION: 95 % | RESPIRATION RATE: 16 BRPM | HEART RATE: 66 BPM | SYSTOLIC BLOOD PRESSURE: 123 MMHG | BODY MASS INDEX: 27 KG/M2 | DIASTOLIC BLOOD PRESSURE: 72 MMHG | HEIGHT: 65.5 IN | WEIGHT: 164 LBS | TEMPERATURE: 98.2 F

## 2024-06-07 DIAGNOSIS — R10.32 LEFT LOWER QUADRANT PAIN: ICD-10-CM

## 2024-06-07 DIAGNOSIS — K40.20 BILATERAL INGUINAL HERNIA, W/OUT OBSTRUCTION OR GANGRENE, NOT SPECIFIED AS RECURRENT: ICD-10-CM

## 2024-06-07 PROCEDURE — 99214 OFFICE O/P EST MOD 30 MIN: CPT

## 2024-06-07 NOTE — HISTORY OF PRESENT ILLNESS
[de-identified] : Mr. CALLES is a 73-year-old man with left inguinal hernia and umbilical hernia, seen initially in July 2023 who returns for follow-up. He again reports rare instances of pain at left groin. Patient reports that he feels as if the bulge has increased in size. Denies fever/chills/nausea/emesis or changes in bowel or bladder habits. Tolerating diet. Normal bowel movements.

## 2024-06-07 NOTE — ASSESSMENT
[FreeTextEntry1] :   Mr. CALLES is a 73 year old man with left inguinal hernia and umbilical hernia. Patient reports discomfort at times.   Denies inferences with daily life. At this time patient does not want to pursue surgery and would like to follow up in office in 3 months. Ultrasound for further evaluation.

## 2024-06-07 NOTE — PHYSICAL EXAM
[Normal Thyroid] : the thyroid was normal [No Rash or Lesion] : No rash or lesion [Alert] : alert [Oriented to Person] : oriented to person [Oriented to Place] : oriented to place [Oriented to Time] : oriented to time [Calm] : calm [JVD] : no jugular venous distention  [de-identified] :  No acute distress [de-identified] :  No respiratory distress [de-identified] :  Regular rate [de-identified] : soft, nontender. no rebound or guarding. palpable left inguinal hernia - partially reducible, unable to feel defect mild tenderness, no overlying skin changes, small umbilical hernia   [de-identified] :  normal range of motion

## 2024-06-07 NOTE — PLAN
[FreeTextEntry1] : Follow up in 3 months or sooner   The patient understands and agrees that if he experiences pain, fever/chills/nausea/emesis or other symptoms he will contact the office or present to the ED immediately.

## 2024-06-20 ENCOUNTER — NON-APPOINTMENT (OUTPATIENT)
Age: 74
End: 2024-06-20

## 2024-10-14 ENCOUNTER — APPOINTMENT (OUTPATIENT)
Dept: INTERNAL MEDICINE | Facility: CLINIC | Age: 74
End: 2024-10-14
Payer: MEDICARE

## 2024-10-14 VITALS
HEART RATE: 65 BPM | SYSTOLIC BLOOD PRESSURE: 124 MMHG | HEIGHT: 65.5 IN | BODY MASS INDEX: 26.34 KG/M2 | DIASTOLIC BLOOD PRESSURE: 70 MMHG | WEIGHT: 160 LBS

## 2024-10-14 DIAGNOSIS — M35.3 POLYMYALGIA RHEUMATICA: ICD-10-CM

## 2024-10-14 DIAGNOSIS — I10 ESSENTIAL (PRIMARY) HYPERTENSION: ICD-10-CM

## 2024-10-14 DIAGNOSIS — E78.5 HYPERLIPIDEMIA, UNSPECIFIED: ICD-10-CM

## 2024-10-14 DIAGNOSIS — N40.0 BENIGN PROSTATIC HYPERPLASIA WITHOUT LOWER URINARY TRACT SYMPMS: ICD-10-CM

## 2024-10-14 DIAGNOSIS — F41.9 ANXIETY DISORDER, UNSPECIFIED: ICD-10-CM

## 2024-10-14 PROCEDURE — 99214 OFFICE O/P EST MOD 30 MIN: CPT

## 2024-10-14 PROCEDURE — G2211 COMPLEX E/M VISIT ADD ON: CPT

## 2024-10-14 PROCEDURE — 36415 COLL VENOUS BLD VENIPUNCTURE: CPT

## 2024-10-17 ENCOUNTER — NON-APPOINTMENT (OUTPATIENT)
Age: 74
End: 2024-10-17

## 2024-10-17 LAB
ALBUMIN SERPL ELPH-MCNC: 4.5 G/DL
ALP BLD-CCNC: 36 U/L
ALT SERPL-CCNC: 16 U/L
ANION GAP SERPL CALC-SCNC: 14 MMOL/L
AST SERPL-CCNC: 21 U/L
BASOPHILS # BLD AUTO: 0.05 K/UL
BASOPHILS NFR BLD AUTO: 0.6 %
BILIRUB SERPL-MCNC: 0.3 MG/DL
BUN SERPL-MCNC: 16 MG/DL
CALCIUM SERPL-MCNC: 10.2 MG/DL
CHLORIDE SERPL-SCNC: 105 MMOL/L
CHOLEST SERPL-MCNC: 136 MG/DL
CO2 SERPL-SCNC: 24 MMOL/L
CREAT SERPL-MCNC: 1.31 MG/DL
EGFR: 56 ML/MIN/1.73M2
EOSINOPHIL # BLD AUTO: 0.19 K/UL
EOSINOPHIL NFR BLD AUTO: 2.4 %
GLUCOSE SERPL-MCNC: 106 MG/DL
HCT VFR BLD CALC: 44.5 %
HDLC SERPL-MCNC: 47 MG/DL
HGB BLD-MCNC: 13.6 G/DL
IMM GRANULOCYTES NFR BLD AUTO: 0.1 %
LDLC SERPL CALC-MCNC: 71 MG/DL
LYMPHOCYTES # BLD AUTO: 1.45 K/UL
LYMPHOCYTES NFR BLD AUTO: 18.3 %
MAN DIFF?: NORMAL
MCHC RBC-ENTMCNC: 24.5 PG
MCHC RBC-ENTMCNC: 30.6 GM/DL
MCV RBC AUTO: 80.3 FL
MONOCYTES # BLD AUTO: 0.6 K/UL
MONOCYTES NFR BLD AUTO: 7.6 %
NEUTROPHILS # BLD AUTO: 5.63 K/UL
NEUTROPHILS NFR BLD AUTO: 71 %
NONHDLC SERPL-MCNC: 89 MG/DL
PLATELET # BLD AUTO: 294 K/UL
POTASSIUM SERPL-SCNC: 5.2 MMOL/L
PROT SERPL-MCNC: 7.3 G/DL
RBC # BLD: 5.54 M/UL
RBC # FLD: 15.7 %
SODIUM SERPL-SCNC: 143 MMOL/L
TRIGL SERPL-MCNC: 98 MG/DL
TSH SERPL-ACNC: 0.81 UIU/ML
WBC # FLD AUTO: 7.93 K/UL

## 2024-12-02 ENCOUNTER — APPOINTMENT (OUTPATIENT)
Dept: INTERNAL MEDICINE | Facility: CLINIC | Age: 74
End: 2024-12-02

## 2025-01-10 ENCOUNTER — APPOINTMENT (OUTPATIENT)
Dept: SURGERY | Facility: CLINIC | Age: 75
End: 2025-01-10
Payer: MEDICARE

## 2025-01-10 VITALS
BODY MASS INDEX: 27.98 KG/M2 | HEIGHT: 65.5 IN | SYSTOLIC BLOOD PRESSURE: 133 MMHG | OXYGEN SATURATION: 99 % | TEMPERATURE: 98.3 F | WEIGHT: 170 LBS | HEART RATE: 62 BPM | DIASTOLIC BLOOD PRESSURE: 70 MMHG | RESPIRATION RATE: 16 BRPM

## 2025-01-10 DIAGNOSIS — K40.90 UNILATERAL INGUINAL HERNIA, W/OUT OBSTRUCTION OR GANGRENE, NOT SPECIFIED AS RECURRENT: ICD-10-CM

## 2025-01-10 DIAGNOSIS — K42.9 UMBILICAL HERNIA W/OUT OBSTRUCTION OR GANGRENE: ICD-10-CM

## 2025-01-10 PROCEDURE — 99214 OFFICE O/P EST MOD 30 MIN: CPT

## 2025-03-17 ENCOUNTER — RX RENEWAL (OUTPATIENT)
Age: 75
End: 2025-03-17

## 2025-04-11 ENCOUNTER — NON-APPOINTMENT (OUTPATIENT)
Age: 75
End: 2025-04-11

## 2025-04-16 ENCOUNTER — APPOINTMENT (OUTPATIENT)
Dept: INTERNAL MEDICINE | Facility: CLINIC | Age: 75
End: 2025-04-16
Payer: MEDICARE

## 2025-04-16 VITALS
HEART RATE: 69 BPM | OXYGEN SATURATION: 98 % | BODY MASS INDEX: 26.34 KG/M2 | HEIGHT: 65.5 IN | WEIGHT: 160 LBS | SYSTOLIC BLOOD PRESSURE: 125 MMHG | DIASTOLIC BLOOD PRESSURE: 60 MMHG

## 2025-04-16 DIAGNOSIS — R79.9 ABNORMAL FINDING OF BLOOD CHEMISTRY, UNSPECIFIED: ICD-10-CM

## 2025-04-16 DIAGNOSIS — N40.0 BENIGN PROSTATIC HYPERPLASIA WITHOUT LOWER URINARY TRACT SYMPMS: ICD-10-CM

## 2025-04-16 DIAGNOSIS — Z00.00 ENCOUNTER FOR GENERAL ADULT MEDICAL EXAMINATION W/OUT ABNORMAL FINDINGS: ICD-10-CM

## 2025-04-16 DIAGNOSIS — E78.5 HYPERLIPIDEMIA, UNSPECIFIED: ICD-10-CM

## 2025-04-16 DIAGNOSIS — F41.9 ANXIETY DISORDER, UNSPECIFIED: ICD-10-CM

## 2025-04-16 DIAGNOSIS — I10 ESSENTIAL (PRIMARY) HYPERTENSION: ICD-10-CM

## 2025-04-16 PROCEDURE — 36415 COLL VENOUS BLD VENIPUNCTURE: CPT

## 2025-04-16 PROCEDURE — G0439: CPT

## 2025-04-16 RX ORDER — FINASTERIDE 5 MG/1
5 TABLET, FILM COATED ORAL
Refills: 0 | Status: ACTIVE | COMMUNITY
Start: 2025-04-16

## 2025-04-16 RX ORDER — TAMSULOSIN HYDROCHLORIDE 0.4 MG/1
0.4 CAPSULE ORAL
Qty: 180 | Refills: 1 | Status: ACTIVE | COMMUNITY
Start: 2025-04-16

## 2025-04-18 LAB
ALBUMIN SERPL ELPH-MCNC: 4.5 G/DL
ALP BLD-CCNC: 39 U/L
ALT SERPL-CCNC: 13 U/L
ANION GAP SERPL CALC-SCNC: 13 MMOL/L
AST SERPL-CCNC: 20 U/L
BASOPHILS # BLD AUTO: 0.05 K/UL
BASOPHILS NFR BLD AUTO: 0.7 %
BILIRUB SERPL-MCNC: 0.3 MG/DL
BUN SERPL-MCNC: 21 MG/DL
CALCIUM SERPL-MCNC: 10.3 MG/DL
CHLORIDE SERPL-SCNC: 102 MMOL/L
CHOLEST SERPL-MCNC: 131 MG/DL
CO2 SERPL-SCNC: 24 MMOL/L
CREAT SERPL-MCNC: 1.27 MG/DL
EGFRCR SERPLBLD CKD-EPI 2021: 59 ML/MIN/1.73M2
EOSINOPHIL # BLD AUTO: 0.26 K/UL
EOSINOPHIL NFR BLD AUTO: 3.9 %
ESTIMATED AVERAGE GLUCOSE: 134 MG/DL
GLUCOSE SERPL-MCNC: 101 MG/DL
HBA1C MFR BLD HPLC: 6.3 %
HCT VFR BLD CALC: 45.4 %
HDLC SERPL-MCNC: 39 MG/DL
HGB BLD-MCNC: 14 G/DL
IMM GRANULOCYTES NFR BLD AUTO: 0.1 %
LDLC SERPL-MCNC: 75 MG/DL
LYMPHOCYTES # BLD AUTO: 1.51 K/UL
LYMPHOCYTES NFR BLD AUTO: 22.4 %
MAN DIFF?: NORMAL
MCHC RBC-ENTMCNC: 25 PG
MCHC RBC-ENTMCNC: 30.8 G/DL
MCV RBC AUTO: 81.1 FL
MONOCYTES # BLD AUTO: 0.59 K/UL
MONOCYTES NFR BLD AUTO: 8.8 %
NEUTROPHILS # BLD AUTO: 4.32 K/UL
NEUTROPHILS NFR BLD AUTO: 64.1 %
NONHDLC SERPL-MCNC: 92 MG/DL
PLATELET # BLD AUTO: 403 K/UL
POTASSIUM SERPL-SCNC: 5.1 MMOL/L
PROT SERPL-MCNC: 7.6 G/DL
RBC # BLD: 5.6 M/UL
RBC # FLD: 15.2 %
SODIUM SERPL-SCNC: 139 MMOL/L
TRIGL SERPL-MCNC: 91 MG/DL
TSH SERPL-ACNC: 0.94 UIU/ML
WBC # FLD AUTO: 6.74 K/UL

## 2025-04-25 ENCOUNTER — NON-APPOINTMENT (OUTPATIENT)
Age: 75
End: 2025-04-25

## 2025-05-23 ENCOUNTER — APPOINTMENT (OUTPATIENT)
Dept: SURGERY | Facility: CLINIC | Age: 75
End: 2025-05-23
Payer: MEDICARE

## 2025-05-23 VITALS
HEART RATE: 58 BPM | OXYGEN SATURATION: 99 % | HEIGHT: 64.5 IN | SYSTOLIC BLOOD PRESSURE: 121 MMHG | DIASTOLIC BLOOD PRESSURE: 71 MMHG | BODY MASS INDEX: 27.49 KG/M2 | RESPIRATION RATE: 16 BRPM | WEIGHT: 163 LBS | TEMPERATURE: 98.3 F

## 2025-05-23 PROCEDURE — 99213 OFFICE O/P EST LOW 20 MIN: CPT

## 2025-07-18 ENCOUNTER — APPOINTMENT (OUTPATIENT)
Dept: SURGERY | Facility: CLINIC | Age: 75
End: 2025-07-18
Payer: MEDICARE

## 2025-07-18 VITALS
HEIGHT: 64.5 IN | WEIGHT: 164 LBS | DIASTOLIC BLOOD PRESSURE: 70 MMHG | SYSTOLIC BLOOD PRESSURE: 120 MMHG | HEART RATE: 61 BPM | RESPIRATION RATE: 14 BRPM | BODY MASS INDEX: 27.66 KG/M2 | TEMPERATURE: 97.6 F

## 2025-07-18 PROCEDURE — 99214 OFFICE O/P EST MOD 30 MIN: CPT

## 2025-09-05 ENCOUNTER — RX RENEWAL (OUTPATIENT)
Age: 75
End: 2025-09-05

## 2025-09-08 ENCOUNTER — RX RENEWAL (OUTPATIENT)
Age: 75
End: 2025-09-08